# Patient Record
Sex: MALE | Race: BLACK OR AFRICAN AMERICAN
[De-identification: names, ages, dates, MRNs, and addresses within clinical notes are randomized per-mention and may not be internally consistent; named-entity substitution may affect disease eponyms.]

---

## 2018-08-13 ENCOUNTER — HOSPITAL ENCOUNTER (INPATIENT)
Dept: HOSPITAL 72 - EMR | Age: 58
LOS: 2 days | DRG: 871 | End: 2018-08-15
Payer: MEDICARE

## 2018-08-13 VITALS — DIASTOLIC BLOOD PRESSURE: 56 MMHG | SYSTOLIC BLOOD PRESSURE: 88 MMHG

## 2018-08-13 VITALS — SYSTOLIC BLOOD PRESSURE: 92 MMHG | DIASTOLIC BLOOD PRESSURE: 43 MMHG

## 2018-08-13 VITALS — DIASTOLIC BLOOD PRESSURE: 44 MMHG | SYSTOLIC BLOOD PRESSURE: 104 MMHG

## 2018-08-13 VITALS — SYSTOLIC BLOOD PRESSURE: 93 MMHG | DIASTOLIC BLOOD PRESSURE: 30 MMHG

## 2018-08-13 VITALS — DIASTOLIC BLOOD PRESSURE: 95 MMHG | SYSTOLIC BLOOD PRESSURE: 92 MMHG

## 2018-08-13 VITALS — DIASTOLIC BLOOD PRESSURE: 55 MMHG | SYSTOLIC BLOOD PRESSURE: 77 MMHG

## 2018-08-13 VITALS — SYSTOLIC BLOOD PRESSURE: 91 MMHG | DIASTOLIC BLOOD PRESSURE: 43 MMHG

## 2018-08-13 VITALS — DIASTOLIC BLOOD PRESSURE: 30 MMHG | SYSTOLIC BLOOD PRESSURE: 93 MMHG

## 2018-08-13 VITALS — SYSTOLIC BLOOD PRESSURE: 87 MMHG | DIASTOLIC BLOOD PRESSURE: 28 MMHG

## 2018-08-13 VITALS — SYSTOLIC BLOOD PRESSURE: 100 MMHG | DIASTOLIC BLOOD PRESSURE: 44 MMHG

## 2018-08-13 VITALS — BODY MASS INDEX: 44.1 KG/M2 | HEIGHT: 71 IN | WEIGHT: 315 LBS

## 2018-08-13 VITALS — DIASTOLIC BLOOD PRESSURE: 51 MMHG | SYSTOLIC BLOOD PRESSURE: 84 MMHG

## 2018-08-13 VITALS — DIASTOLIC BLOOD PRESSURE: 38 MMHG | SYSTOLIC BLOOD PRESSURE: 91 MMHG

## 2018-08-13 VITALS — SYSTOLIC BLOOD PRESSURE: 96 MMHG | DIASTOLIC BLOOD PRESSURE: 49 MMHG

## 2018-08-13 DIAGNOSIS — G93.40: ICD-10-CM

## 2018-08-13 DIAGNOSIS — Z99.81: ICD-10-CM

## 2018-08-13 DIAGNOSIS — E78.5: ICD-10-CM

## 2018-08-13 DIAGNOSIS — E66.2: ICD-10-CM

## 2018-08-13 DIAGNOSIS — J96.22: ICD-10-CM

## 2018-08-13 DIAGNOSIS — Z78.1: ICD-10-CM

## 2018-08-13 DIAGNOSIS — E66.01: ICD-10-CM

## 2018-08-13 DIAGNOSIS — N18.9: ICD-10-CM

## 2018-08-13 DIAGNOSIS — G47.33: ICD-10-CM

## 2018-08-13 DIAGNOSIS — A41.9: Primary | ICD-10-CM

## 2018-08-13 DIAGNOSIS — M25.552: ICD-10-CM

## 2018-08-13 DIAGNOSIS — D69.6: ICD-10-CM

## 2018-08-13 DIAGNOSIS — Z91.14: ICD-10-CM

## 2018-08-13 DIAGNOSIS — R65.21: ICD-10-CM

## 2018-08-13 DIAGNOSIS — D63.1: ICD-10-CM

## 2018-08-13 DIAGNOSIS — J96.21: ICD-10-CM

## 2018-08-13 DIAGNOSIS — E86.0: ICD-10-CM

## 2018-08-13 DIAGNOSIS — E11.22: ICD-10-CM

## 2018-08-13 DIAGNOSIS — N17.9: ICD-10-CM

## 2018-08-13 DIAGNOSIS — E87.5: ICD-10-CM

## 2018-08-13 DIAGNOSIS — E87.1: ICD-10-CM

## 2018-08-13 DIAGNOSIS — M25.551: ICD-10-CM

## 2018-08-13 DIAGNOSIS — E11.649: ICD-10-CM

## 2018-08-13 DIAGNOSIS — Z74.01: ICD-10-CM

## 2018-08-13 DIAGNOSIS — I12.9: ICD-10-CM

## 2018-08-13 DIAGNOSIS — Z91.19: ICD-10-CM

## 2018-08-13 LAB
ADD MANUAL DIFF: YES
ALBUMIN SERPL-MCNC: 4.1 G/DL (ref 3.4–5)
ALBUMIN/GLOB SERPL: 1.5 {RATIO} (ref 1–2.7)
ALP SERPL-CCNC: 57 U/L (ref 46–116)
ALT SERPL-CCNC: 151 U/L (ref 12–78)
ANION GAP SERPL CALC-SCNC: 11 MMOL/L (ref 5–15)
APPEARANCE UR: CLEAR
APTT PPP: YELLOW S
AST SERPL-CCNC: 37 U/L (ref 15–37)
BILIRUB SERPL-MCNC: 0.6 MG/DL (ref 0.2–1)
BUN SERPL-MCNC: 82 MG/DL (ref 7–18)
CALCIUM SERPL-MCNC: 10.9 MG/DL (ref 8.5–10.1)
CHLORIDE SERPL-SCNC: 96 MMOL/L (ref 98–107)
CK SERPL-CCNC: 335 U/L (ref 26–308)
CO2 SERPL-SCNC: 21 MMOL/L (ref 21–32)
CREAT SERPL-MCNC: 9.6 MG/DL (ref 0.55–1.3)
ERYTHROCYTE [DISTWIDTH] IN BLOOD BY AUTOMATED COUNT: 13.8 % (ref 11.6–14.8)
GLOBULIN SER-MCNC: 2.7 G/DL
GLUCOSE UR STRIP-MCNC: NEGATIVE MG/DL
HCT VFR BLD CALC: 24.3 % (ref 42–52)
HGB BLD-MCNC: 8.5 G/DL (ref 14.2–18)
KETONES UR QL STRIP: NEGATIVE
LEUKOCYTE ESTERASE UR QL STRIP: (no result)
MCV RBC AUTO: 87 FL (ref 80–99)
NITRITE UR QL STRIP: NEGATIVE
PH UR STRIP: 5 [PH] (ref 4.5–8)
PLATELET # BLD: 60 K/UL (ref 150–450)
POTASSIUM SERPL-SCNC: 7.8 MMOL/L (ref 3.5–5.1)
PROT UR QL STRIP: (no result)
RBC # BLD AUTO: 2.8 M/UL (ref 4.7–6.1)
SODIUM SERPL-SCNC: 129 MMOL/L (ref 136–145)
SP GR UR STRIP: 1.01 (ref 1–1.03)
UROBILINOGEN UR-MCNC: NORMAL MG/DL (ref 0–1)
WBC # BLD AUTO: 6.8 K/UL (ref 4.8–10.8)

## 2018-08-13 PROCEDURE — 86703 HIV-1/HIV-2 1 RESULT ANTBDY: CPT

## 2018-08-13 PROCEDURE — 85044 MANUAL RETICULOCYTE COUNT: CPT

## 2018-08-13 PROCEDURE — 94002 VENT MGMT INPAT INIT DAY: CPT

## 2018-08-13 PROCEDURE — 83735 ASSAY OF MAGNESIUM: CPT

## 2018-08-13 PROCEDURE — 71045 X-RAY EXAM CHEST 1 VIEW: CPT

## 2018-08-13 PROCEDURE — 83880 ASSAY OF NATRIURETIC PEPTIDE: CPT

## 2018-08-13 PROCEDURE — 82746 ASSAY OF FOLIC ACID SERUM: CPT

## 2018-08-13 PROCEDURE — 80053 COMPREHEN METABOLIC PANEL: CPT

## 2018-08-13 PROCEDURE — 87081 CULTURE SCREEN ONLY: CPT

## 2018-08-13 PROCEDURE — 76770 US EXAM ABDO BACK WALL COMP: CPT

## 2018-08-13 PROCEDURE — 74018 RADEX ABDOMEN 1 VIEW: CPT

## 2018-08-13 PROCEDURE — 83615 LACTATE (LD) (LDH) ENZYME: CPT

## 2018-08-13 PROCEDURE — 82977 ASSAY OF GGT: CPT

## 2018-08-13 PROCEDURE — 84443 ASSAY THYROID STIM HORMONE: CPT

## 2018-08-13 PROCEDURE — 80048 BASIC METABOLIC PNL TOTAL CA: CPT

## 2018-08-13 PROCEDURE — 81003 URINALYSIS AUTO W/O SCOPE: CPT

## 2018-08-13 PROCEDURE — 82270 OCCULT BLOOD FECES: CPT

## 2018-08-13 PROCEDURE — 85007 BL SMEAR W/DIFF WBC COUNT: CPT

## 2018-08-13 PROCEDURE — 36600 WITHDRAWAL OF ARTERIAL BLOOD: CPT

## 2018-08-13 PROCEDURE — 87340 HEPATITIS B SURFACE AG IA: CPT

## 2018-08-13 PROCEDURE — 36415 COLL VENOUS BLD VENIPUNCTURE: CPT

## 2018-08-13 PROCEDURE — 86920 COMPATIBILITY TEST SPIN: CPT

## 2018-08-13 PROCEDURE — 93970 EXTREMITY STUDY: CPT

## 2018-08-13 PROCEDURE — 87040 BLOOD CULTURE FOR BACTERIA: CPT

## 2018-08-13 PROCEDURE — 82962 GLUCOSE BLOOD TEST: CPT

## 2018-08-13 PROCEDURE — 93005 ELECTROCARDIOGRAM TRACING: CPT

## 2018-08-13 PROCEDURE — 86705 HEP B CORE ANTIBODY IGM: CPT

## 2018-08-13 PROCEDURE — 02HV33Z INSERTION OF INFUSION DEVICE INTO SUPERIOR VENA CAVA, PERCUTANEOUS APPROACH: ICD-10-PCS

## 2018-08-13 PROCEDURE — 84100 ASSAY OF PHOSPHORUS: CPT

## 2018-08-13 PROCEDURE — 84550 ASSAY OF BLOOD/URIC ACID: CPT

## 2018-08-13 PROCEDURE — 80061 LIPID PANEL: CPT

## 2018-08-13 PROCEDURE — 94003 VENT MGMT INPAT SUBQ DAY: CPT

## 2018-08-13 PROCEDURE — 85025 COMPLETE CBC W/AUTO DIFF WBC: CPT

## 2018-08-13 PROCEDURE — 86900 BLOOD TYPING SEROLOGIC ABO: CPT

## 2018-08-13 PROCEDURE — 83036 HEMOGLOBIN GLYCOSYLATED A1C: CPT

## 2018-08-13 PROCEDURE — 87086 URINE CULTURE/COLONY COUNT: CPT

## 2018-08-13 PROCEDURE — 82803 BLOOD GASES ANY COMBINATION: CPT

## 2018-08-13 PROCEDURE — 02H633Z INSERTION OF INFUSION DEVICE INTO RIGHT ATRIUM, PERCUTANEOUS APPROACH: ICD-10-PCS

## 2018-08-13 PROCEDURE — 82550 ASSAY OF CK (CPK): CPT

## 2018-08-13 PROCEDURE — 86850 RBC ANTIBODY SCREEN: CPT

## 2018-08-13 PROCEDURE — 86901 BLOOD TYPING SEROLOGIC RH(D): CPT

## 2018-08-13 PROCEDURE — 76700 US EXAM ABDOM COMPLETE: CPT

## 2018-08-13 PROCEDURE — 86709 HEPATITIS A IGM ANTIBODY: CPT

## 2018-08-13 PROCEDURE — 94664 DEMO&/EVAL PT USE INHALER: CPT

## 2018-08-13 PROCEDURE — 86803 HEPATITIS C AB TEST: CPT

## 2018-08-13 PROCEDURE — 83540 ASSAY OF IRON: CPT

## 2018-08-13 PROCEDURE — 93306 TTE W/DOPPLER COMPLETE: CPT

## 2018-08-13 PROCEDURE — 82728 ASSAY OF FERRITIN: CPT

## 2018-08-13 PROCEDURE — 86140 C-REACTIVE PROTEIN: CPT

## 2018-08-13 PROCEDURE — 83605 ASSAY OF LACTIC ACID: CPT

## 2018-08-13 PROCEDURE — 94640 AIRWAY INHALATION TREATMENT: CPT

## 2018-08-13 PROCEDURE — 84484 ASSAY OF TROPONIN QUANT: CPT

## 2018-08-13 PROCEDURE — 82607 VITAMIN B-12: CPT

## 2018-08-13 PROCEDURE — 83550 IRON BINDING TEST: CPT

## 2018-08-13 PROCEDURE — 83010 ASSAY OF HAPTOGLOBIN QUANT: CPT

## 2018-08-13 PROCEDURE — 82248 BILIRUBIN DIRECT: CPT

## 2018-08-13 RX ADMIN — SODIUM CHLORIDE SCH MLS/HR: 0.9 INJECTION INTRAVENOUS at 22:51

## 2018-08-13 RX ADMIN — DAPTOMYCIN SCH MLS/HR: 500 INJECTION, POWDER, LYOPHILIZED, FOR SOLUTION INTRAVENOUS at 23:00

## 2018-08-13 NOTE — CONSULTATION
Consult Note


Consult Note





seen in ER


Assessment/Plan





acute renal failure- (on Glucophage- Lotensin), Hypotension


underlying chronic renal failure-


morbid obesity-


DM


Hypotension


HyperKalemia


ROSAS


Anemia








IV fluid-


Dialysis -


ICU setting


Keep BP over 90 syst


Anemia ahuja


avoid nephrotoxics











Filippo Herrera MD Aug 13, 2018 22:00

## 2018-08-13 NOTE — BRIEF OPERATIVE NOTE
Immediate Post Operative Note


Operative Note


Pre-op Diagnosis:


sepsis, shock, hypotension, hyperkalemia, renal insufficiency,


Procedure:


1. right IJ TLC placement


2. right IJ HD cath placement


Post-op Diagnosis:  same as pre-op


Surgeon:  tesha


Anesthesia:  local


Specimen:  none


Complications:  none


Condition:  unstable


Fluids:  n/a


Estimated Blood Loss:  minimal


Drains:  none


Implant(s) used?:  Shane Perez Aug 13, 2018 21:21

## 2018-08-13 NOTE — PULMONOLGY CRITICAL CARE NOTE
Critical Care - Asmt/Plan


Problems:  


(1) HTN (hypertension)


(2) Diabetes


(3) Morbid obesity


(4) Obesity hypoventilation syndrome


(5) ROSAS (obstructive sleep apnea)


(6) Dehydration


(7) Hypotension


(8) Hyponatremia


(9) Hyperkalemia


(10) AIRAM (acute kidney injury)


(11) Anemia


Respiratory:  monitor respiratory rate, adjust FIO2 - PRN O2 to keep SaO2 > 90%

, CXR, ABG, other - BiPAP 15/5 qHS and PRN


Cardiac:  continue to monitor HR/BP, other - TTE


Renal:  keep IV fluid, check electrolytes, other - HD without UF ordered by 

renal


Infectious Disease:  continue antibiotics - per ID, F/u Cx's


Gastrointestinal:  start feedings - PO with aspiration precautions


Endocrine:  monitor blood sugar, check TSH, check HgA1C, other - Sliding scale


Neurologic:  keep patient comfortable


Prophylaxis:  Protonix, Heparin


Disposition:  keep in ICU


Time Spent (Minutes):  60


Notes Reviewed:  renal, other - Surgery, ERMD


Discussed with:  nurses, consultants





Critical Care - Objective





Last 24 Hour Vital Signs








  Date Time  Temp Pulse Resp B/P (MAP) Pulse Ox O2 Delivery O2 Flow Rate FiO2


 


8/13/18 19:45 98.4 69 20 93/30 100 Nasal Cannula 2.0 28





 98.4       


 


8/13/18 19:30 98.2 64 22 87/28 100 Nasal Cannula 2.0 28





 98.2       


 


8/13/18 19:27  60 20  100 Nasal Cannula 2.0 28


 


8/13/18 19:21  52 19  100 Nasal Cannula 2.0 28


 


8/13/18 19:20  57 19   Nasal Cannula 2.0 28


 


8/13/18 19:20      Nasal Cannula 2.0 28


 


8/13/18 19:15  57 19 91/38 100 Nasal Cannula 2.0 


 


8/13/18 19:10  57 19 91/43 100 Nasal Cannula 2.0 


 


8/13/18 19:00  60 19 87/37 100 Nasal Cannula 2.0 


 


8/13/18 19:00  58 19 92/43 100 Nasal Cannula 2.0 


 


8/13/18 18:30  58 17 88/56 100 Nasal Cannula 2.0 


 


8/13/18 17:45 98.4 56 16 77/55 100 Nasal Cannula 2.0 





 98.4       


 


8/13/18 17:45  58 19   Nasal Cannula 2.0 


 


8/13/18 17:37 98.4 64 16 77/55 99 Room Air  





 98.4       








Status:  awake, other - morbidly obese


Condition:  critical


HEENT:  atraumatic, normocephalic


Neck:  full ROM, trach


Lungs:  clear


Heart:  HR/BP unstable


Abdomen:  soft, non-tender, active bowel sounds


Extremities:  no C/C/E


Accucheck:  66


Blood Sugars:  BS not controlled





Critical Care - Subjective


ICU Day:  1


Intubation Day:  N/A


Interval Events:


57 M h/o CHRONIC HYPERCAPNIC RF, ROSAS/OHV, CKD, HTN, HL, DM BIB EMS with AMS and 

hypoTN, Na 129, K 7.8, BUN/Cr 82/9.6.  Seen by surgery R BRANDON Finney placed, HD 

ordered, getting IVF. No F/C/CP/SOB/cough/HA/dizziness/N/V/D/C. He states he 

uses his BiPAP at home nightly and is on 2L O2 RTC> 








Active Scripts








 Medications  Dose


 Route/Sig


 Max Daily Dose Days Date Category


 


 Carvedilol*


  (Carvedilol)


 3.125 Mg Tablet  Unknown Dose


 ORAL EVERY 12 HOURS


    8/13/18 Reported


 


 Isosorbide


 Dinitrate*


  (Isosorbide


 Dinitrate) 5 Mg


 Tablet  Unknown Dose


 ORAL


    8/13/18 Reported


 


 Aspir 81*


  (Aspirin) 81 Mg


 Tablet.dr  81 Mg


 ORAL DAILY


    8/13/18 Reported


 


 Glyburide 5 Mg


 Tablet  5 Mg


 PO


    8/13/18 Reported


 


 Metformin Hcl*


  (Metformin HCl)


 850 Mg Tablet  750 Mg


 ORAL DAILY


    8/13/18 Reported


 


 Benazepril Hcl*


  (Benazepril HCl)


 20 Mg Tablet  20 Mg


 ORAL EVERY 12 HOURS


    8/13/18 Reported


 


 Norvasc


  (Amlodipine


 Besylate) 10 Mg


 Tablet  10 Mg


 ORAL DAILY


    8/13/18 Reported








Condition:  critical


IV Access:  central


EKG Rhythm:  Sinus Rhythm


FI02:  28


Sputum Amount:  None


Fluids:  NS wide open


Labs:





Laboratory Tests








Test


  8/13/18


17:55 8/13/18


19:54


 


White Blood Count


  6.8 K/UL


(4.8-10.8) 


 


 


Red Blood Count


  2.80 M/UL


(4.70-6.10)  L 


 


 


Hemoglobin


  8.5 G/DL


(14.2-18.0)  L 


 


 


Hematocrit


  24.3 %


(42.0-52.0)  L 


 


 


Mean Corpuscular Volume 87 FL (80-99)   


 


Mean Corpuscular Hemoglobin


  30.5 PG


(27.0-31.0) 


 


 


Mean Corpuscular Hemoglobin


Concent 35.1 G/DL


(32.0-36.0) 


 


 


Red Cell Distribution Width


  13.8 %


(11.6-14.8) 


 


 


Platelet Count


  60 K/UL


(150-450)  L 


 


 


Mean Platelet Volume


  6.5 FL


(6.5-10.1) 


 


 


Neutrophils (%) (Auto)


  % (45.0-75.0)


  


 


 


Lymphocytes (%) (Auto)


  % (20.0-45.0)


  


 


 


Monocytes (%) (Auto)  % (1.0-10.0)   


 


Eosinophils (%) (Auto)  % (0.0-3.0)   


 


Basophils (%) (Auto)  % (0.0-2.0)   


 


Differential Total Cells


Counted 100  


  


 


 


Neutrophils % (Manual) 49 % (45-75)   


 


Lymphocytes % (Manual) 38 % (20-45)   


 


Monocytes % (Manual) 10 % (1-10)   


 


Eosinophils % (Manual) 2 % (0-3)   


 


Basophils % (Manual) 1 % (0-2)   


 


Band Neutrophils 0 % (0-8)   


 


Platelet Estimate Decreased  L 


 


Platelet Morphology Normal   


 


Hypochromasia 1+   


 


Anisocytosis 1+   


 


Sodium Level


  129 MMOL/L


(136-145)  L 


 


 


Potassium Level


  7.8 MMOL/L


(3.5-5.1)  *H 


 


 


Chloride Level


  96 MMOL/L


()  L 


 


 


Carbon Dioxide Level


  21 MMOL/L


(21-32) 


 


 


Anion Gap


  11 mmol/L


(5-15) 


 


 


Blood Urea Nitrogen


  82 mg/dL


(7-18)  H 


 


 


Creatinine


  9.6 MG/DL


(0.55-1.30)  H 


 


 


Estimat Glomerular Filtration


Rate 6.9 mL/min


(>60) 


 


 


Glucose Level


  86 MG/DL


() 


 


 


Lactic Acid Level


  1.50 mmol/L


(0.4-2.0) 


 


 


Calcium Level


  10.9 MG/DL


(8.5-10.1)  H 


 


 


Total Bilirubin


  0.6 MG/DL


(0.2-1.0) 


 


 


Aspartate Amino Transf


(AST/SGOT) 37 U/L (15-37)


  


 


 


Alanine Aminotransferase


(ALT/SGPT) 151 U/L


(12-78)  H 


 


 


Alkaline Phosphatase


  57 U/L


() 


 


 


Total Creatine Kinase


  335 U/L


()  H 


 


 


Troponin I


  0.000 ng/mL


(0.000-0.056) 


 


 


Total Protein


  6.8 G/DL


(6.4-8.2) 


 


 


Albumin


  4.1 G/DL


(3.4-5.0) 


 


 


Globulin 2.7 g/dL   


 


Albumin/Globulin Ratio 1.5 (1.0-2.7)   


 


Urine Color  Yellow  


 


Urine Appearance  Clear  


 


Urine pH  5 (4.5-8.0)  


 


Urine Specific Gravity


  


  1.015


(1.005-1.035)


 


Urine Protein


  


  2+ (NEGATIVE)


H


 


Urine Glucose (UA)


  


  Negative


(NEGATIVE)


 


Urine Ketones


  


  Negative


(NEGATIVE)


 


Urine Occult Blood


  


  2+ (NEGATIVE)


H


 


Urine Nitrite


  


  Negative


(NEGATIVE)


 


Urine Bilirubin


  


  1+ (NEGATIVE)


H


 


Urine Ictotest


  


  Negative


(NEGATIVE)


 


Urine Urobilinogen


  


  Normal MG/DL


(0.0-1.0)


 


Urine Leukocyte Esterase


  


  1+ (NEGATIVE)


H


 


Urine RBC


  


  5-10 /HPF (0 -


0)  H


 


Urine WBC


  


  2-4 /HPF (0 -


0)


 


Urine Squamous Epithelial


Cells 


  None /LPF


(NONE/OCC)


 


Urine Bacteria


  


  Few /HPF


(NONE)

















Tristan Aceves MD Aug 13, 2018 21:55

## 2018-08-14 VITALS — DIASTOLIC BLOOD PRESSURE: 50 MMHG | SYSTOLIC BLOOD PRESSURE: 73 MMHG

## 2018-08-14 VITALS — SYSTOLIC BLOOD PRESSURE: 74 MMHG | DIASTOLIC BLOOD PRESSURE: 33 MMHG

## 2018-08-14 VITALS — DIASTOLIC BLOOD PRESSURE: 36 MMHG | SYSTOLIC BLOOD PRESSURE: 75 MMHG

## 2018-08-14 VITALS — SYSTOLIC BLOOD PRESSURE: 132 MMHG | DIASTOLIC BLOOD PRESSURE: 51 MMHG

## 2018-08-14 VITALS — SYSTOLIC BLOOD PRESSURE: 133 MMHG | DIASTOLIC BLOOD PRESSURE: 43 MMHG

## 2018-08-14 VITALS — SYSTOLIC BLOOD PRESSURE: 121 MMHG | DIASTOLIC BLOOD PRESSURE: 54 MMHG

## 2018-08-14 VITALS — SYSTOLIC BLOOD PRESSURE: 100 MMHG | DIASTOLIC BLOOD PRESSURE: 39 MMHG

## 2018-08-14 VITALS — SYSTOLIC BLOOD PRESSURE: 91 MMHG | DIASTOLIC BLOOD PRESSURE: 48 MMHG

## 2018-08-14 VITALS — SYSTOLIC BLOOD PRESSURE: 76 MMHG | DIASTOLIC BLOOD PRESSURE: 36 MMHG

## 2018-08-14 VITALS — SYSTOLIC BLOOD PRESSURE: 102 MMHG | DIASTOLIC BLOOD PRESSURE: 40 MMHG

## 2018-08-14 VITALS — DIASTOLIC BLOOD PRESSURE: 29 MMHG | SYSTOLIC BLOOD PRESSURE: 111 MMHG

## 2018-08-14 VITALS — DIASTOLIC BLOOD PRESSURE: 44 MMHG | SYSTOLIC BLOOD PRESSURE: 116 MMHG

## 2018-08-14 VITALS — DIASTOLIC BLOOD PRESSURE: 121 MMHG | SYSTOLIC BLOOD PRESSURE: 140 MMHG

## 2018-08-14 VITALS — SYSTOLIC BLOOD PRESSURE: 95 MMHG | DIASTOLIC BLOOD PRESSURE: 29 MMHG

## 2018-08-14 VITALS — DIASTOLIC BLOOD PRESSURE: 40 MMHG | SYSTOLIC BLOOD PRESSURE: 117 MMHG

## 2018-08-14 VITALS — DIASTOLIC BLOOD PRESSURE: 71 MMHG | SYSTOLIC BLOOD PRESSURE: 101 MMHG

## 2018-08-14 VITALS — SYSTOLIC BLOOD PRESSURE: 101 MMHG | DIASTOLIC BLOOD PRESSURE: 36 MMHG

## 2018-08-14 VITALS — SYSTOLIC BLOOD PRESSURE: 129 MMHG | DIASTOLIC BLOOD PRESSURE: 37 MMHG

## 2018-08-14 VITALS — SYSTOLIC BLOOD PRESSURE: 107 MMHG | DIASTOLIC BLOOD PRESSURE: 50 MMHG

## 2018-08-14 VITALS — DIASTOLIC BLOOD PRESSURE: 33 MMHG | SYSTOLIC BLOOD PRESSURE: 79 MMHG

## 2018-08-14 VITALS — SYSTOLIC BLOOD PRESSURE: 117 MMHG | DIASTOLIC BLOOD PRESSURE: 40 MMHG

## 2018-08-14 VITALS — DIASTOLIC BLOOD PRESSURE: 34 MMHG | SYSTOLIC BLOOD PRESSURE: 90 MMHG

## 2018-08-14 VITALS — DIASTOLIC BLOOD PRESSURE: 26 MMHG | SYSTOLIC BLOOD PRESSURE: 61 MMHG

## 2018-08-14 VITALS — SYSTOLIC BLOOD PRESSURE: 141 MMHG | DIASTOLIC BLOOD PRESSURE: 53 MMHG

## 2018-08-14 VITALS — SYSTOLIC BLOOD PRESSURE: 107 MMHG | DIASTOLIC BLOOD PRESSURE: 51 MMHG

## 2018-08-14 VITALS — DIASTOLIC BLOOD PRESSURE: 39 MMHG | SYSTOLIC BLOOD PRESSURE: 80 MMHG

## 2018-08-14 VITALS — SYSTOLIC BLOOD PRESSURE: 95 MMHG | DIASTOLIC BLOOD PRESSURE: 32 MMHG

## 2018-08-14 VITALS — DIASTOLIC BLOOD PRESSURE: 43 MMHG | SYSTOLIC BLOOD PRESSURE: 104 MMHG

## 2018-08-14 VITALS — DIASTOLIC BLOOD PRESSURE: 50 MMHG | SYSTOLIC BLOOD PRESSURE: 94 MMHG

## 2018-08-14 VITALS — DIASTOLIC BLOOD PRESSURE: 36 MMHG | SYSTOLIC BLOOD PRESSURE: 78 MMHG

## 2018-08-14 VITALS — DIASTOLIC BLOOD PRESSURE: 49 MMHG | SYSTOLIC BLOOD PRESSURE: 103 MMHG

## 2018-08-14 VITALS — SYSTOLIC BLOOD PRESSURE: 86 MMHG | DIASTOLIC BLOOD PRESSURE: 35 MMHG

## 2018-08-14 VITALS — SYSTOLIC BLOOD PRESSURE: 100 MMHG | DIASTOLIC BLOOD PRESSURE: 38 MMHG

## 2018-08-14 LAB
% IRON SATURATION: 36 % (ref 15–50)
% IRON SATURATION: 36 % (ref 15–50)
ADD MANUAL DIFF: NO
ADD MANUAL DIFF: NO
ADD MANUAL DIFF: YES
ALBUMIN SERPL-MCNC: 3.5 G/DL (ref 3.4–5)
ALBUMIN/GLOB SERPL: 1.5 {RATIO} (ref 1–2.7)
ALP SERPL-CCNC: 45 U/L (ref 46–116)
ALT SERPL-CCNC: 132 U/L (ref 12–78)
ANION GAP SERPL CALC-SCNC: 12 MMOL/L (ref 5–15)
ANION GAP SERPL CALC-SCNC: 7 MMOL/L (ref 5–15)
ANION GAP SERPL CALC-SCNC: 7 MMOL/L (ref 5–15)
AST SERPL-CCNC: 36 U/L (ref 15–37)
BASOPHILS NFR BLD AUTO: 5.8 % (ref 0–2)
BILIRUB SERPL-MCNC: 0.5 MG/DL (ref 0.2–1)
BUN SERPL-MCNC: 76 MG/DL (ref 7–18)
BUN SERPL-MCNC: 77 MG/DL (ref 7–18)
BUN SERPL-MCNC: 78 MG/DL (ref 7–18)
CALCIUM SERPL-MCNC: 9 MG/DL (ref 8.5–10.1)
CALCIUM SERPL-MCNC: 9.2 MG/DL (ref 8.5–10.1)
CALCIUM SERPL-MCNC: 9.6 MG/DL (ref 8.5–10.1)
CHLORIDE SERPL-SCNC: 101 MMOL/L (ref 98–107)
CHLORIDE SERPL-SCNC: 103 MMOL/L (ref 98–107)
CHLORIDE SERPL-SCNC: 104 MMOL/L (ref 98–107)
CHOLEST SERPL-MCNC: 123 MG/DL (ref ?–200)
CO2 SERPL-SCNC: 18 MMOL/L (ref 21–32)
CO2 SERPL-SCNC: 19 MMOL/L (ref 21–32)
CO2 SERPL-SCNC: 20 MMOL/L (ref 21–32)
CREAT SERPL-MCNC: 8 MG/DL (ref 0.55–1.3)
CREAT SERPL-MCNC: 8.3 MG/DL (ref 0.55–1.3)
CREAT SERPL-MCNC: 8.8 MG/DL (ref 0.55–1.3)
EOSINOPHIL NFR BLD AUTO: 1.4 % (ref 0–3)
ERYTHROCYTE [DISTWIDTH] IN BLOOD BY AUTOMATED COUNT: 13.6 % (ref 11.6–14.8)
ERYTHROCYTE [DISTWIDTH] IN BLOOD BY AUTOMATED COUNT: 14.5 % (ref 11.6–14.8)
ERYTHROCYTE [DISTWIDTH] IN BLOOD BY AUTOMATED COUNT: 14.7 % (ref 11.6–14.8)
FERRITIN SERPL-MCNC: 494 NG/ML (ref 8–388)
FERRITIN SERPL-MCNC: 611 NG/ML (ref 8–388)
GAMMA GLUTAMYL TRANSPEPTIDASE: 314 U/L (ref 5–85)
GLOBULIN SER-MCNC: 2.3 G/DL
HCT VFR BLD CALC: 20.2 % (ref 42–52)
HCT VFR BLD CALC: 26.2 % (ref 42–52)
HCT VFR BLD CALC: 26.9 % (ref 42–52)
HDLC SERPL-MCNC: 39 MG/DL (ref 40–60)
HGB BLD-MCNC: 7 G/DL (ref 14.2–18)
HGB BLD-MCNC: 9.1 G/DL (ref 14.2–18)
HGB BLD-MCNC: 9.1 G/DL (ref 14.2–18)
IRON SERPL-MCNC: 84 UG/DL (ref 50–175)
IRON SERPL-MCNC: 90 UG/DL (ref 50–175)
LDH SERPL L TO P-CCNC: 96 U/L (ref 81–234)
LYMPHOCYTES NFR BLD AUTO: 31.2 % (ref 20–45)
MCV RBC AUTO: 86 FL (ref 80–99)
MCV RBC AUTO: 90 FL (ref 80–99)
MCV RBC AUTO: 91 FL (ref 80–99)
MONOCYTES NFR BLD AUTO: 16.1 % (ref 1–10)
NEUTROPHILS NFR BLD AUTO: 45.5 % (ref 45–75)
PHOSPHATE SERPL-MCNC: 5.1 MG/DL (ref 2.5–4.9)
PLATELET # BLD: 47 K/UL (ref 150–450)
PLATELET # BLD: 53 K/UL (ref 150–450)
PLATELET # BLD: 55 K/UL (ref 150–450)
POTASSIUM SERPL-SCNC: 7.1 MMOL/L (ref 3.5–5.1)
POTASSIUM SERPL-SCNC: 7.9 MMOL/L (ref 3.5–5.1)
POTASSIUM SERPL-SCNC: 8.2 MMOL/L (ref 3.5–5.1)
RBC # BLD AUTO: 2.36 M/UL (ref 4.7–6.1)
RBC # BLD AUTO: 2.92 M/UL (ref 4.7–6.1)
RBC # BLD AUTO: 2.97 M/UL (ref 4.7–6.1)
SODIUM SERPL-SCNC: 129 MMOL/L (ref 136–145)
SODIUM SERPL-SCNC: 130 MMOL/L (ref 136–145)
SODIUM SERPL-SCNC: 132 MMOL/L (ref 136–145)
TIBC SERPL-MCNC: 236 UG/DL (ref 250–450)
TIBC SERPL-MCNC: 248 UG/DL (ref 250–450)
TRIGL SERPL-MCNC: 64 MG/DL (ref 30–150)
UNSATURATED IRON BINDING: 152 UG/DL (ref 112–346)
UNSATURATED IRON BINDING: 158 UG/DL (ref 112–346)
WBC # BLD AUTO: 11.1 K/UL (ref 4.8–10.8)
WBC # BLD AUTO: 5.1 K/UL (ref 4.8–10.8)
WBC # BLD AUTO: 6.3 K/UL (ref 4.8–10.8)

## 2018-08-14 PROCEDURE — 5A1D70Z PERFORMANCE OF URINARY FILTRATION, INTERMITTENT, LESS THAN 6 HOURS PER DAY: ICD-10-PCS

## 2018-08-14 PROCEDURE — 0BH17EZ INSERTION OF ENDOTRACHEAL AIRWAY INTO TRACHEA, VIA NATURAL OR ARTIFICIAL OPENING: ICD-10-PCS

## 2018-08-14 PROCEDURE — 30233N1 TRANSFUSION OF NONAUTOLOGOUS RED BLOOD CELLS INTO PERIPHERAL VEIN, PERCUTANEOUS APPROACH: ICD-10-PCS

## 2018-08-14 PROCEDURE — 3E043XZ INTRODUCTION OF VASOPRESSOR INTO CENTRAL VEIN, PERCUTANEOUS APPROACH: ICD-10-PCS

## 2018-08-14 PROCEDURE — 5A1935Z RESPIRATORY VENTILATION, LESS THAN 24 CONSECUTIVE HOURS: ICD-10-PCS

## 2018-08-14 RX ADMIN — HUMAN INSULIN SCH MLS/HR: 100 INJECTION, SOLUTION SUBCUTANEOUS at 16:00

## 2018-08-14 RX ADMIN — DAPTOMYCIN SCH MLS/HR: 500 INJECTION, POWDER, LYOPHILIZED, FOR SOLUTION INTRAVENOUS at 07:20

## 2018-08-14 RX ADMIN — IPRATROPIUM BROMIDE AND ALBUTEROL SULFATE SCH ML: .5; 3 SOLUTION RESPIRATORY (INHALATION) at 12:56

## 2018-08-14 RX ADMIN — IPRATROPIUM BROMIDE AND ALBUTEROL SULFATE SCH ML: .5; 3 SOLUTION RESPIRATORY (INHALATION) at 19:00

## 2018-08-14 RX ADMIN — PANTOPRAZOLE SODIUM SCH MG: 40 INJECTION, POWDER, FOR SOLUTION INTRAVENOUS at 11:08

## 2018-08-14 RX ADMIN — HUMAN INSULIN SCH MLS/HR: 100 INJECTION, SOLUTION SUBCUTANEOUS at 21:50

## 2018-08-14 RX ADMIN — HUMAN INSULIN SCH MLS/HR: 100 INJECTION, SOLUTION SUBCUTANEOUS at 11:36

## 2018-08-14 RX ADMIN — PANTOPRAZOLE SODIUM SCH MG: 40 INJECTION, POWDER, FOR SOLUTION INTRAVENOUS at 21:50

## 2018-08-14 RX ADMIN — SODIUM CHLORIDE SCH MLS/HR: 0.9 INJECTION INTRAVENOUS at 11:08

## 2018-08-14 RX ADMIN — HYDROCORTISONE SODIUM SUCCINATE SCH MG: 100 INJECTION, POWDER, FOR SOLUTION INTRAMUSCULAR; INTRAVENOUS at 22:45

## 2018-08-14 RX ADMIN — SODIUM CHLORIDE SCH MLS/HR: 0.9 INJECTION INTRAVENOUS at 21:51

## 2018-08-14 NOTE — CARDIOLOGY PROGRESS NOTE
Assessment/Plan


Assessment/Plan


The patient is seen and examined, full consult note will be dictated.





Objective





Last 24 Hour Vital Signs








  Date Time  Temp Pulse Resp B/P (MAP) Pulse Ox O2 Delivery O2 Flow Rate FiO2


 


8/14/18 19:19      Bi-pap  25


 


8/14/18 19:00  85 19 94/50 (65) 99   


 


8/14/18 19:00    86/70    


 


8/14/18 18:40 98.4 85 20 121/54 (76)    





 98.4       


 


8/14/18 18:32    119/41    


 


8/14/18 18:00    133/28    


 


8/14/18 18:00  82 21 140/121 (127) 100   


 


8/14/18 17:15    131/38    


 


8/14/18 17:05  75 23  94 Facial  25


 


8/14/18 17:00  81 21 133/43 (73) 99   


 


8/14/18 17:00    121/39    


 


8/14/18 16:45    124/43    


 


8/14/18 16:45  80 20 117/40 (65) 97   


 


8/14/18 16:30  76 19 116/44 (68) 99   


 


8/14/18 16:30    125/44    


 


8/14/18 16:15  78 23 75/36 (49) 98   


 


8/14/18 16:15    119/30    


 


8/14/18 16:05    68/26    


 


8/14/18 16:00      Bi-pap  


 


8/14/18 16:00 97.7 77 23 111/29 (56) 98   





 97.7       


 


8/14/18 15:25  80      


 


8/14/18 15:00      Bi-pap  25


 


8/14/18 15:00 97.8 80 20 100/30 (53)    





 97.8       


 


8/14/18 15:00  78 25 101/71 (81) 87   


 


8/14/18 14:52  80 26  97 Facial  25


 


8/14/18 14:00  78 16 90/34 (52) 94   


 


8/14/18 13:06  77 23  100 Bi-pap  25


 


8/14/18 13:00        25


 


8/14/18 13:00  78 16 100/38 (58) 96   


 


8/14/18 12:56  74 22  100 Bi-pap  25


 


8/14/18 12:55  77 22  100 Facial  25


 


8/14/18 12:00        30


 


8/14/18 12:00  77      


 


8/14/18 12:00      Nasal Cannula 2.0 


 


8/14/18 12:00 98.5 79 23 111/29 (56) 98   





 98.5       


 


8/14/18 11:30  77 28  99 Facial  30


 


8/14/18 11:18        30


 


8/14/18 11:00  84 23 103/49 (67) 97   


 


8/14/18 10:00  88 23 117/40 (65) 99   


 


8/14/18 09:00  85 24 104/43 (63) 100   


 


8/14/18 08:00  84 18   Venturi Mask 15.0 50


 


8/14/18 08:00      Venturi Mask 15.0 50


 


8/14/18 08:00  78      


 


8/14/18 08:00      Nasal Cannula 2.0 


 


8/14/18 08:00 98.0 84 22 102/40 (60) 100   





 98.0       


 


8/14/18 07:00  82 23 107/50 (69) 100   


 


8/14/18 07:00      Venturi Mask  


 


8/14/18 07:00      Venturi Mask  


 


8/14/18 06:00  81 26 107/51 (69) 100   


 


8/14/18 05:00  76 20 95/32 (53) 100   


 


8/14/18 04:00        50


 


8/14/18 04:00 95.4 74 20 101/36 (57) 100   





 95.4       


 


8/14/18 04:00      Venturi Mask 15.0 


 


8/14/18 03:57  76      


 


8/14/18 03:30  76 20 100/39 (59) 100   


 


8/14/18 03:00  77 17 76/36 (49) 100   


 


8/14/18 02:53      Venturi Mask 13.5 


 


8/14/18 02:44 94.7 79 20 80/39 (53)    





 94.7       


 


8/14/18 02:00  73 17 61/26 (38) 100   


 


8/14/18 01:00  71 16 74/33 (47) 100   


 


8/14/18 00:22  71      


 


8/14/18 00:00  69 15 73/50 (58) 100   


 


8/14/18 00:00        50


 


8/14/18 00:00      Venturi Mask 15.0 


 


8/13/18 23:30  74 22 104/44 (64) 100   


 


8/13/18 23:00 93.9 72 20 99/95 (96)    





 93.9       


 


8/13/18 23:00      Venturi Mask 13.5 


 


8/13/18 23:00  72 19 92/35 (54) 100   


 


8/13/18 22:59      Venturi Mask 15.0 50


 


8/13/18 22:59  66 20   Venturi Mask 15.0 50


 


8/13/18 22:45  73 19 100/44 (62) 100   


 


8/13/18 22:30 94.7 74 20 96/49 (65) 98   





 94.7       


 


8/13/18 22:17  82      


 


8/13/18 22:16 93.9 76 20 84/51 (62) 98   





 93.9       


 


8/13/18 22:15      Nasal Cannula 2.0 


 


8/13/18 22:13 98.4 65 20 95/34 100 Nasal Cannula 2.0 28





 98.4       


 


8/13/18 22:13 98.4 69 20 93/30 100 Nasal Cannula 2.0 28





 98.4       


 


8/13/18 22:10 98.4 69 20 93/30 100 Nasal Cannula 2.0 28





 98.4       

















Intake and Output  


 


 8/13/18 8/14/18





 19:00 07:00


 


Intake Total  2110 ml


 


Output Total  120 ml


 


Balance  1990 ml


 


  


 


Intake Oral  200 ml


 


IV Total  1910 ml


 


Output Urine Total  120 ml


 


Hemodialysis UF  0 ml


 


# Voids  1











Laboratory Tests








Test


  8/13/18


21:44 8/14/18


04:00 8/14/18


04:40 8/14/18


09:00


 


Arterial Blood pH


  7.333


(7.350-7.450) 


  


  


 


 


Arterial Blood Partial


Pressure CO2 29.3 mmHg


(35.0-45.0)  L 


  


  


 


 


Arterial Blood Partial


Pressure O2 40.5 mmHg


(75.0-100.0) 


  


  


 


 


Arterial Blood HCO3


  15.2 mmol/L


(22.0-26.0)  L 


  


  


 


 


Arterial Blood Oxygen


Saturation 71.9 %


(92.0-98.0)  L 


  


  


 


 


Arterial Blood Base Excess -9.7     


 


Ken Test Positive     


 


C-Reactive Protein,


Quantitative 


  < 0.4 mg/dL


(0.00-0.90) < 0.4 mg/dL


(0.00-0.90) 


 


 


White Blood Count


  


  


  6.3 K/UL


(4.8-10.8) 


 


 


Red Blood Count


  


  


  2.36 M/UL


(4.70-6.10)  L 


 


 


Hemoglobin


  


  


  7.0 G/DL


(14.2-18.0)  L 


 


 


Hematocrit


  


  


  20.2 %


(42.0-52.0)  L 


 


 


Mean Corpuscular Volume   86 FL (80-99)   


 


Mean Corpuscular Hemoglobin


  


  


  29.9 PG


(27.0-31.0) 


 


 


Mean Corpuscular Hemoglobin


Concent 


  


  34.8 G/DL


(32.0-36.0) 


 


 


Red Cell Distribution Width


  


  


  13.6 %


(11.6-14.8) 


 


 


Platelet Count


  


  


  53 K/UL


(150-450)  L 


 


 


Mean Platelet Volume


  


  


  6.6 FL


(6.5-10.1) 


 


 


Neutrophils (%) (Auto)


  


  


  % (45.0-75.0)


  


 


 


Lymphocytes (%) (Auto)


  


  


  % (20.0-45.0)


  


 


 


Monocytes (%) (Auto)    % (1.0-10.0)   


 


Eosinophils (%) (Auto)    % (0.0-3.0)   


 


Basophils (%) (Auto)    % (0.0-2.0)   


 


Differential Total Cells


Counted 


  


  100  


  


 


 


Neutrophils % (Manual)   45 % (45-75)   


 


Lymphocytes % (Manual)   42 % (20-45)   


 


Monocytes % (Manual)   12 % (1-10)  H 


 


Eosinophils % (Manual)   1 % (0-3)   


 


Basophils % (Manual)   0 % (0-2)   


 


Band Neutrophils   0 % (0-8)   


 


Nucleated Red Blood Cells   2 /100 WBC   


 


Platelet Estimate   Decreased  L 


 


Platelet Morphology   Normal   


 


Hypochromasia   3+   


 


Anisocytosis   1+   


 


Spherocytes   2+   


 


Reticulocyte Count


  


  


  1.3 %


(0.0-2.0) 


 


 


Haptoglobin   Pending   


 


Sodium Level


  


  


  132 MMOL/L


(136-145)  L 


 


 


Potassium Level


  


  


  7.1 MMOL/L


(3.5-5.1)  *H 


 


 


Chloride Level


  


  


  101 MMOL/L


() 


 


 


Carbon Dioxide Level


  


  


  20 MMOL/L


(21-32)  L 


 


 


Anion Gap


  


  


  12 mmol/L


(5-15) 


 


 


Blood Urea Nitrogen


  


  


  78 mg/dL


(7-18)  H 


 


 


Creatinine


  


  


  8.8 MG/DL


(0.55-1.30)  H 


 


 


Estimat Glomerular Filtration


Rate 


  


  7.6 mL/min


(>60) 


 


 


Glucose Level


  


  


  25 MG/DL


()  *L 


 


 


Hemoglobin A1c


  


  


  6.3 %


(4.3-6.0)  H 


 


 


Uric Acid


  


  


  21.4 MG/DL


(2.6-7.2)  H 


 


 


Calcium Level


  


  


  9.6 MG/DL


(8.5-10.1) 


 


 


Phosphorus Level


  


  


  5.1 MG/DL


(2.5-4.9)  H 


 


 


Magnesium Level


  


  


  1.9 MG/DL


(1.8-2.4) 


 


 


Iron Level


  


  


  84 ug/dL


() 90 ug/dL


()


 


Total Iron Binding Capacity


  


  


  236 ug/dL


(250-450)  L 248 ug/dL


(250-450)  L


 


Percent Iron Saturation   36 % (15-50)   36 % (15-50)  


 


Unsaturated Iron Binding


  


  


  152 ug/dL


(112-346) 158 ug/dL


(112-346)


 


Ferritin


  


  


  494 NG/ML


(8-388)  H 611 NG/ML


(8-388)  H


 


Total Bilirubin


  


  


  0.5 MG/DL


(0.2-1.0) 


 


 


Gamma Glutamyl Transpeptidase


  


  


  314 U/L (5-85)


H 


 


 


Aspartate Amino Transf


(AST/SGOT) 


  


  36 U/L (15-37)


  


 


 


Alanine Aminotransferase


(ALT/SGPT) 


  


  132 U/L


(12-78)  H 


 


 


Alkaline Phosphatase


  


  


  45 U/L


()  L 


 


 


Troponin I


  


  


  0.172 ng/mL


(0.000-0.056) 


 


 


Pro-B-Type Natriuretic Peptide


  


  


  751 pg/mL


(0-125)  H 


 


 


Total Protein


  


  


  5.8 G/DL


(6.4-8.2)  L 


 


 


Albumin


  


  


  3.5 G/DL


(3.4-5.0) 


 


 


Globulin   2.3 g/dL   


 


Albumin/Globulin Ratio   1.5 (1.0-2.7)   


 


Triglycerides Level


  


  


  64 MG/DL


() 


 


 


Cholesterol Level


  


  


  123 MG/DL (<


200) 


 


 


LDL Cholesterol


  


  


  72 mg/dL


(<100) 


 


 


HDL Cholesterol


  


  


  39 MG/DL


(40-60)  L 


 


 


Cholesterol/HDL Ratio


  


  


  3.2 (3.3-4.4)


L 


 


 


Vitamin B12 Level


  


  


  341 PG/ML


(193-986) 353 PG/ML


(193-986)


 


Folate


  


  


  4.9 NG/ML


(8.6-58.9)  L 4.4 NG/ML


(8.6-58.9)  L


 


Thyroid Stimulating Hormone


(TSH) 


  


  0.681 uiU/mL


(0.358-3.740) 


 


 


Lactate Dehydrogenase


  


  


  


  96 U/L


()


 


HIV (1&2) Antibody Rapid


  


  


  


  Negative


(NEGATIVE)


 


Test


  8/14/18


09:50 8/14/18


10:50 8/14/18


12:00 8/14/18


13:35


 


Arterial Blood pH


  7.192


(7.350-7.450) 


  


  7.155


(7.350-7.450)


 


Arterial Blood Partial


Pressure CO2 46.0 mmHg


(35.0-45.0)  H 


  


  48.5 mmHg


(35.0-45.0)  H


 


Arterial Blood Partial


Pressure O2 132.7 mmHg


(75.0-100.0)  H 


  


  84.2 mmHg


(75.0-100.0)


 


Arterial Blood HCO3


  17.3 mmol/L


(22.0-26.0)  L 


  


  16.7 mmol/L


(22.0-26.0)  L


 


Arterial Blood Oxygen


Saturation 97.2 %


(92.0-98.0) 


  


  92.9 %


(92.0-98.0)


 


Arterial Blood Base Excess -10.2     -11.3  


 


Ken Test Positive     Positive  


 


Stool Occult Blood


  


  Negative


(NEGATIVE) 


  


 


 


Troponin I


  


  


  0.083 ng/mL


(0.000-0.056) 


 


 


Hepatitis A IgM Antibody   Pending   


 


Hepatitis B Surface Antigen   Pending   


 


Hepatitis B Core IgM Antibody   Pending   


 


Hepatitis C Antibody   Pending   


 


Test


  8/14/18


19:28 


  


  


 


 


Arterial Blood pH


  7.181


(7.350-7.450) 


  


  


 


 


Arterial Blood Partial


Pressure CO2 40.5 mmHg


(35.0-45.0) 


  


  


 


 


Arterial Blood Partial


Pressure O2 76.7 mmHg


(75.0-100.0) 


  


  


 


 


Arterial Blood HCO3


  14.8 mmol/L


(22.0-26.0)  L 


  


  


 


 


Arterial Blood Oxygen


Saturation 93.1 %


(92.0-98.0) 


  


  


 


 


Arterial Blood Base Excess -12.7     


 


Ken Test Positive     











Microbiology








 Date/Time


Source Procedure


Growth Status


 


 


 8/13/18 19:54


Rectum  Received

















Drew Gomez MD Aug 14, 2018 20:07

## 2018-08-14 NOTE — CONSULTATION
Consult Note


Consult Note


Hematology Consult





ROZINA BRAMBILA: SARAVANAN King


DOS: 8/14/18


RFC: anemia eval, thrombocytopenia





HPI


Mr. Johnson is a very pleasant 58 yo male with hx of severe obesity, ROSAS, CKD, 

and NIDDM.  He presents with severe generalized weakness for 3 weeks.  Worse 

today. Unable to get up and stand on his own.  He has been dieting recently.  

He is only eating vegetables and drinking water.  He also been using laxatives 

which has caused diffuse diarrhea.  He's had bilateral hip pain for several 

weeks since being bedbound.  He denies fever.  Denies abdominal pain.  Denies 

chest pain.  Ct elevated at baseline, pending eval with renal today for 

potential HD





Medications


Amlodipine 10 mg


Aspirin 325 mg


benazepril 20 mg


Glyburide 5 mg


Metformin 750 mg


Isosorbide 10 mg


Albuterol


Allergies:  


Coded Allergies:  


     No Known Allergies (Unverified , 8/13/18)





Nursing Documentation-[facility]


Past Medical History:  No History, Except For


Hx Cardiac Problems:  Yes - CHF


Hx Hypertension:  Yes


Hx Diabetes:  Yes - type 2





 ER ROS - General 


Review of Systems


Constitutional:  Reports: malaise continues to persist


Respiratory:  Denies: cough


Cardiovascular:  Denies: chest pain


Gastrointestinal:  Reports: diarrhea; Denies: abdominal pain, melena, 

hematemesis


Musculoskeletal:  Reports: back pain, muscle pain


All Other Systems:  negative except mentioned in HPI





 ER Physical Exam - General 


Physical Exam





Vital Signs








  Date Time  Temp Pulse Resp B/P (MAP) Pulse Ox O2 Delivery O2 Flow Rate FiO2


 


8/13/18 17:37 98.4 64 16 77/55 99 Room Air  





 98.4       


 


8/13/18 17:45       2.0 


 


8/13/18 19:20        28








Sp02 EP Interpretation:  reviewed, normal


General Appearance:  alert, non-toxic, mild distress, other - pale clammy


Head:  normocephalic, atraumatic


Eyes:  bilateral eye normal inspection, bilateral eye PERRL


ENT:  hearing grossly normal, normal pharynx, no angioedema, normal voice, dry 

mucus membranes


Neck:  full range of motion, supple/symm/no masses


Respiratory:  chest non-tender, lungs clear, normal breath sounds, speaking 

full sentences


Cardiovascular #1:  regular rate, rhythm, no murmur


Cardiovascular #2:  1+ radial (R), 1+ radial (L)


Gastrointestinal:  normal bowel sounds, non tender, soft, no guarding, no 

rebound


Rectal:  normal exam, normal rectal tone


Musculoskeletal:  back normal, normal range of motion, non-tender


Neurologic:  alert, oriented x3, responsive


Psychiatric:  judgement/insight normal


Skin:  normal color, no rash, warm/dry


Lymphatic:  no adenopathy











Labs








Test


  8/13/18


17:55 8/13/18


19:54 8/13/18


21:44


 


White Blood Count


  6.8 K/UL


(4.8-10.8) 


  


 


 


Red Blood Count


  2.80 M/UL


(4.70-6.10) 


  


 


 


Hemoglobin


  8.5 G/DL


(14.2-18.0) 


  


 


 


Hematocrit


  24.3 %


(42.0-52.0) 


  


 


 


Mean Corpuscular Volume 87 FL (80-99)   


 


Mean Corpuscular Hemoglobin


  30.5 PG


(27.0-31.0) 


  


 


 


Mean Corpuscular Hemoglobin


Concent 35.1 G/DL


(32.0-36.0) 


  


 


 


Red Cell Distribution Width


  13.8 %


(11.6-14.8) 


  


 


 


Platelet Count


  60 K/UL


(150-450) 


  


 


 


Mean Platelet Volume


  6.5 FL


(6.5-10.1) 


  


 


 


Neutrophils (%) (Auto)  % (45.0-75.0)   


 


Lymphocytes (%) (Auto)  % (20.0-45.0)   


 


Monocytes (%) (Auto)  % (1.0-10.0)   


 


Eosinophils (%) (Auto)  % (0.0-3.0)   


 


Basophils (%) (Auto)  % (0.0-2.0)   


 


Differential Total Cells


Counted 100 


  


  


 


 


Neutrophils % (Manual) 49 % (45-75)   


 


Lymphocytes % (Manual) 38 % (20-45)   


 


Monocytes % (Manual) 10 % (1-10)   


 


Eosinophils % (Manual) 2 % (0-3)   


 


Basophils % (Manual) 1 % (0-2)   


 


Band Neutrophils 0 % (0-8)   


 


Platelet Estimate Decreased   


 


Platelet Morphology Normal   


 


Hypochromasia 1+   


 


Anisocytosis 1+   


 


Sodium Level


  129 MMOL/L


(136-145) 


  


 


 


Potassium Level


  7.8 MMOL/L


(3.5-5.1) 


  


 


 


Chloride Level


  96 MMOL/L


() 


  


 


 


Carbon Dioxide Level


  21 MMOL/L


(21-32) 


  


 


 


Anion Gap


  11 mmol/L


(5-15) 


  


 


 


Blood Urea Nitrogen


  82 mg/dL


(7-18) 


  


 


 


Creatinine


  9.6 MG/DL


(0.55-1.30) 


  


 


 


Estimat Glomerular Filtration


Rate 6.9 mL/min


(>60) 


  


 


 


Glucose Level


  86 MG/DL


() 


  


 


 


Lactic Acid Level


  1.50 mmol/L


(0.4-2.0) 


  


 


 


Calcium Level


  10.9 MG/DL


(8.5-10.1) 


  


 


 


Total Bilirubin


  0.6 MG/DL


(0.2-1.0) 


  


 


 


Aspartate Amino Transf


(AST/SGOT) 37 U/L (15-37) 


  


  


 


 


Alanine Aminotransferase


(ALT/SGPT) 151 U/L


(12-78) 


  


 


 


Alkaline Phosphatase


  57 U/L


() 


  


 


 


Total Creatine Kinase


  335 U/L


() 


  


 


 


Troponin I


  0.000 ng/mL


(0.000-0.056) 


  


 


 


Total Protein


  6.8 G/DL


(6.4-8.2) 


  


 


 


Albumin


  4.1 G/DL


(3.4-5.0) 


  


 


 


Globulin 2.7 g/dL   


 


Albumin/Globulin Ratio 1.5 (1.0-2.7)   


 


Urine Color  Yellow  


 


Urine Appearance  Clear  


 


Urine pH  5 (4.5-8.0)  


 


Urine Specific Gravity


  


  1.015


(1.005-1.035) 


 


 


Urine Protein  2+ (NEGATIVE)  


 


Urine Glucose (UA)


  


  Negative


(NEGATIVE) 


 


 


Urine Ketones


  


  Negative


(NEGATIVE) 


 


 


Urine Occult Blood  2+ (NEGATIVE)  


 


Urine Nitrite


  


  Negative


(NEGATIVE) 


 


 


Urine Bilirubin  1+ (NEGATIVE)  


 


Urine Ictotest


  


  Negative


(NEGATIVE) 


 


 


Urine Urobilinogen


  


  Normal MG/DL


(0.0-1.0) 


 


 


Urine Leukocyte Esterase  1+ (NEGATIVE)  


 


Urine RBC


  


  5-10 /HPF (0 -


0) 


 


 


Urine WBC


  


  2-4 /HPF (0 -


0) 


 


 


Urine Squamous Epithelial


Cells 


  None /LPF


(NONE/OCC) 


 


 


Urine Bacteria


  


  Few /HPF


(NONE) 


 


 


Arterial Blood pH


  


  


  7.333


(7.350-7.450)


 


Arterial Blood Partial


Pressure CO2 


  


  29.3 mmHg


(35.0-45.0)


 


Arterial Blood Partial


Pressure O2 


  


  40.5 mmHg


(75.0-100.0)


 


Arterial Blood HCO3


  


  


  15.2 mmol/L


(22.0-26.0)


 


Arterial Blood Oxygen


Saturation 


  


  71.9 %


(92.0-98.0)


 


Arterial Blood Base Excess   -9.7 


 


Ken Test   Positive 








EKG Diagnostic Results


Rate:  bradycardiac


Rhythm:  other - sinus bradycardia


ST Segments:  other - right bundle branch block, wide QRS


Other Impression








Assessment and Recs:


# Thrombocytopenia - currently plt at 50k, potentially related to infection 

versus chronic


--> hepatitis and hiv ordered to eval


--> us of the abd ordered ro hsm and cirrhosis


# Anemia of kidney disease/chronic disease


--> anemia panel has been ordered


--> transfuse to hgb >7


--> consider iron and epo depending on what anemia panel shows


--> begin on iv iron for total of 5 doses


--> begin folic acid 1mg po daily


# Acute on chronic renal failure with AIRAM (acute kidney injury)


--> may need HD as per nephrology to eval


# Hyperkalemia has received kayxelate


# Obesity hypoventilation syndrome


# Diabetes


# Morbid obesity





GREATLY APPRECIATE CONSULTATION











Paolo Link MD Aug 14, 2018 10:09

## 2018-08-14 NOTE — NEPHROLOGY PROGRESS NOTE
Assessment/Plan


Problem List:  


(1) AIRAM (acute kidney injury)


(2) Dehydration


(3) Hyperkalemia


(4) Hypotension


(5) ROSAS (obstructive sleep apnea)


(6) Morbid obesity


(7) Anemia


(8) Diabetes


Assessment


acute renal failure- (on Glucophage- Lotensin), Hypotension


underlying chronic renal failure-


morbid obesity-


DM


Hypotension


HyperKalemia


ROSAS


Anemia


Plan


IV fluid-


Dialysis - done once- will do again


ICU setting


Keep BP over 90 syst


Anemia ahuaj, B12- Folate- Iron- Transfuse


avoid nephrotoxics


change IV to D5NS





Subjective


ROS Limited/Unobtainable:  No


Constitutional:  Reports: malaise, weakness





Objective


Objective





Last 24 Hour Vital Signs








  Date Time  Temp Pulse Resp B/P (MAP) Pulse Ox O2 Delivery O2 Flow Rate FiO2


 


8/14/18 08:00  84 18   Venturi Mask 15.0 50


 


8/14/18 08:00      Venturi Mask 15.0 50


 


8/14/18 07:00  82 23 107/50 (69) 100   


 


8/14/18 07:00      Venturi Mask  


 


8/14/18 07:00      Venturi Mask  


 


8/14/18 06:00  81 26 107/51 (69) 100   


 


8/14/18 05:00  76 20 95/32 (53) 100   


 


8/14/18 04:00        50


 


8/14/18 04:00 95.4 74 20 101/36 (57) 100   





 95.4       


 


8/14/18 04:00      Venturi Mask 15.0 


 


8/14/18 03:57  76      


 


8/14/18 03:30  76 20 100/39 (59) 100   


 


8/14/18 03:00  77 17 76/36 (49) 100   


 


8/14/18 02:53      Venturi Mask 13.5 


 


8/14/18 02:44 94.7 79 20 80/39 (53)    





 94.7       


 


8/14/18 02:00  73 17 61/26 (38) 100   


 


8/14/18 01:00  71 16 74/33 (47) 100   


 


8/14/18 00:22  71      


 


8/14/18 00:00  69 15 73/50 (58) 100   


 


8/14/18 00:00        50


 


8/14/18 00:00      Venturi Mask 15.0 


 


8/13/18 23:30  74 22 104/44 (64) 100   


 


8/13/18 23:00 93.9 72 20 99/95 (96)    





 93.9       


 


8/13/18 23:00      Venturi Mask 13.5 


 


8/13/18 23:00  72 19 92/35 (54) 100   


 


8/13/18 22:59      Venturi Mask 15.0 50


 


8/13/18 22:59  66 20   Venturi Mask 15.0 50


 


8/13/18 22:45  73 19 100/44 (62) 100   


 


8/13/18 22:30 94.7 74 20 96/49 (65) 98   





 94.7       


 


8/13/18 22:17  82      


 


8/13/18 22:16 93.9 76 20 84/51 (62) 98   





 93.9       


 


8/13/18 22:15      Nasal Cannula 2.0 


 


8/13/18 22:13 98.4 65 20 95/34 100 Nasal Cannula 2.0 28





 98.4       


 


8/13/18 22:13 98.4 69 20 93/30 100 Nasal Cannula 2.0 28





 98.4       


 


8/13/18 22:10 98.4 69 20 93/30 100 Nasal Cannula 2.0 28





 98.4       


 


8/13/18 19:45 98.4 69 20 93/30 100 Nasal Cannula 2.0 28





 98.4       


 


8/13/18 19:30 98.2 64 22 87/28 100 Nasal Cannula 2.0 28





 98.2       


 


8/13/18 19:27  60 20  100 Nasal Cannula 2.0 28


 


8/13/18 19:21  52 19  100 Nasal Cannula 2.0 28


 


8/13/18 19:20  57 19   Nasal Cannula 2.0 28


 


8/13/18 19:20      Nasal Cannula 2.0 28


 


8/13/18 19:15  57 19 91/38 100 Nasal Cannula 2.0 


 


8/13/18 19:10  57 19 91/43 100 Nasal Cannula 2.0 


 


8/13/18 19:00  60 19 87/37 100 Nasal Cannula 2.0 


 


8/13/18 19:00  58 19 92/43 100 Nasal Cannula 2.0 


 


8/13/18 18:30  58 17 88/56 100 Nasal Cannula 2.0 


 


8/13/18 17:45 98.4 56 16 77/55 100 Nasal Cannula 2.0 





 98.4       


 


8/13/18 17:45  58 19   Nasal Cannula 2.0 


 


8/13/18 17:37 98.4 64 16 77/55 99 Room Air  





 98.4       

















Intake and Output  


 


 8/13/18 8/14/18





 19:00 07:00


 


Intake Total  2110 ml


 


Output Total  120 ml


 


Balance  1990 ml


 


  


 


Intake Oral  200 ml


 


IV Total  1910 ml


 


Output Urine Total  120 ml


 


Hemodialysis UF  0 ml


 


# Voids  1








Laboratory Tests


8/13/18 17:55: 


White Blood Count 6.8, Red Blood Count 2.80L, Hemoglobin 8.5L, Hematocrit 24.3L

, Mean Corpuscular Volume 87, Mean Corpuscular Hemoglobin 30.5, Mean 

Corpuscular Hemoglobin Concent 35.1, Red Cell Distribution Width 13.8, Platelet 

Count 60L, Mean Platelet Volume 6.5, Neutrophils (%) (Auto) , Lymphocytes (%) (

Auto) , Monocytes (%) (Auto) , Eosinophils (%) (Auto) , Basophils (%) (Auto) , 

Differential Total Cells Counted 100, Neutrophils % (Manual) 49, Lymphocytes % (

Manual) 38, Monocytes % (Manual) 10, Eosinophils % (Manual) 2, Basophils % (

Manual) 1, Band Neutrophils 0, Platelet Estimate DecreasedL, Platelet 

Morphology Normal, Hypochromasia 1+, Anisocytosis 1+, Sodium Level 129L, 

Potassium Level 7.8*H, Chloride Level 96L, Carbon Dioxide Level 21, Anion Gap 11

, Blood Urea Nitrogen 82H, Creatinine 9.6H, Estimat Glomerular Filtration Rate 

6.9, Glucose Level 86, Lactic Acid Level 1.50, Calcium Level 10.9H, Total 

Bilirubin 0.6, Aspartate Amino Transf (AST/SGOT) 37, Alanine Aminotransferase (

ALT/SGPT) 151H, Alkaline Phosphatase 57, Total Creatine Kinase 335H, Troponin I 

0.000, Total Protein 6.8, Albumin 4.1, Globulin 2.7, Albumin/Globulin Ratio 1.5


8/13/18 19:54: 


Urine Color Yellow, Urine Appearance Clear, Urine pH 5, Urine Specific Gravity 

1.015, Urine Protein 2+H, Urine Glucose (UA) Negative, Urine Ketones Negative, 

Urine Occult Blood 2+H, Urine Nitrite Negative, Urine Bilirubin 1+H, Urine 

Ictotest Negative, Urine Urobilinogen Normal, Urine Leukocyte Esterase 1+H, 

Urine RBC 5-10H, Urine WBC 2-4, Urine Squamous Epithelial Cells None, Urine 

Bacteria Few


8/13/18 21:44: 


Arterial Blood pH 7.333L, Arterial Blood Partial Pressure CO2 29.3L, Arterial 

Blood Partial Pressure O2 40.5*L, Arterial Blood HCO3 15.2L, Arterial Blood 

Oxygen Saturation 71.9L, Arterial Blood Base Excess -9.7, Ken Test Positive


8/14/18 04:40: 


White Blood Count 6.3, Red Blood Count 2.36L, Hemoglobin 7.0L, Hematocrit 20.2L

, Mean Corpuscular Volume 86, Mean Corpuscular Hemoglobin 29.9, Mean 

Corpuscular Hemoglobin Concent 34.8, Red Cell Distribution Width 13.6, Platelet 

Count 53L, Mean Platelet Volume 6.6, Neutrophils (%) (Auto) , Lymphocytes (%) (

Auto) , Monocytes (%) (Auto) , Eosinophils (%) (Auto) , Basophils (%) (Auto) , 

Differential Total Cells Counted 100, Neutrophils % (Manual) 45, Lymphocytes % (

Manual) 42, Monocytes % (Manual) 12H, Eosinophils % (Manual) 1, Basophils % (

Manual) 0, Band Neutrophils 0, Platelet Estimate DecreasedL, Platelet 

Morphology Normal, Hypochromasia 3+, Anisocytosis 1+, Sodium Level 132L, 

Potassium Level 7.1*H, Chloride Level 101, Carbon Dioxide Level 20L, Anion Gap 

12, Blood Urea Nitrogen 78H, Creatinine 8.8H, Estimat Glomerular Filtration 

Rate 7.6, Glucose Level 25*L, Calcium Level 9.6, Total Bilirubin 0.5, Aspartate 

Amino Transf (AST/SGOT) 36, Alanine Aminotransferase (ALT/SGPT) 132H, Alkaline 

Phosphatase 45L, Troponin I 0.172H, Total Protein 5.8L, Albumin 3.5, Globulin 

2.3, Albumin/Globulin Ratio 1.5, Nucleated Red Blood Cells 2, Spherocytes 2+, 

Reticulocyte Count [Pending], Haptoglobin [Pending], Hemoglobin A1c 6.3H, Uric 

Acid 21.4H, Phosphorus Level 5.1H, Magnesium Level 1.9, Iron Level 84, Total 

Iron Binding Capacity 236L, Percent Iron Saturation 36, Unsaturated Iron 

Binding 152, Ferritin 494H, Gamma Glutamyl Transpeptidase 314H, C-Reactive 

Protein, Quantitative < 0.4, Pro-B-Type Natriuretic Peptide 751H, Triglycerides 

Level 64, Cholesterol Level 123, LDL Cholesterol 72, HDL Cholesterol 39L, 

Cholesterol/HDL Ratio 3.2L, Vitamin B12 Level 341, Folate 4.9L, Thyroid 

Stimulating Hormone (TSH) 0.681


8/14/18 09:00: 


Iron Level 90, Total Iron Binding Capacity 248L, Percent Iron Saturation 36, 

Unsaturated Iron Binding 158, Ferritin [Pending], Lactate Dehydrogenase [Pending

], Vitamin B12 Level [Pending], Folate [Pending], HIV (1&2) Antibody Rapid 

Negative


8/14/18 09:50: 


Arterial Blood pH 7.192*L, Arterial Blood Partial Pressure CO2 46.0H, Arterial 

Blood Partial Pressure O2 132.7H, Arterial Blood HCO3 17.3L, Arterial Blood 

Oxygen Saturation 97.2, Arterial Blood Base Excess -10.2, Ken Test Positive


Height (Feet):  5


Height (Inches):  11.00


Weight (Pounds):  385


General Appearance:  mild distress


Cardiovascular:  normal rate


Respiratory/Chest:  decreased breath sounds


Abdomen:  other - obese


Extremities:  other - no edema











Filippo Herrera MD Aug 14, 2018 11:05

## 2018-08-14 NOTE — EMERGENCY ROOM REPORT
History of Present Illness


General


Chief Complaint:  Abnormal Labs


Source:  Patient, Medical Record





Present Illness


Allergies:  


Coded Allergies:  


     No Known Allergies (Unverified , 8/13/18)





Nursing Documentation-[facility]


Past Medical History:  No History, Except For


Hx Cardiac Problems:  Yes


Hx Hypertension:  Yes


Hx Diabetes:  Yes - TYPE 2


Hx Cancer:  No


Hx Gastrointestinal Problems:  No


Hx Neurological Problems:  No





Physical Exam





Vital Signs








  Date Time  Temp Pulse Resp B/P (MAP) Pulse Ox O2 Delivery O2 Flow Rate FiO2


 


8/13/18 17:37 98.4 64 16 77/55 99 Room Air  





 98.4       


 


8/13/18 17:45       2.0 


 


8/13/18 19:20        28











Procedures


Critical Care Time


Critical Care Time


i.  I feel this is a highly complex case requiring extensive working including 

EKG/Rhythm strip, Xray/CT/US, Blood/urine lab work, repeat exams while in ED, 


and administration of strong opiates/narcotics for pain control, admission to 

hospital or close patient follow up.  





Total time: 30 min bedside evaluation and treatment excludes procedures (EKG). 


Reason for critical care: acidosis, respiratory distress


Possible complications: hypotension, hypertension, MI, shock, arrhythmias, 

metabolic acidosis, end organ damage, respiratory failure. 


Interventions: review of ABG. intubation. review of CXR


Course: Patient presenting with profound acidosis without compensation.  

Difficulty breathing.  On BiPAP.  Decision made to intubate the patient.  Due 

to patient's morbid habitus it was very difficult to visualize airway via 

kaleidoscope or direct visualization.  However I was able to place ET tube.  

Chest x-ray confirms ET tube in place.


Consultations: nursing staff, EMS, family 


Performed by: Dr Chacon


Tolerated well condition = critical





j.  because of unstable vital signs this patient had a condition that could 

potentially threaten life or limb.  I feel this is a critical patient who 

required my full attention while patient was considered critical.  Total 

Critical Care Time excluding procedures was greater than 35 minutes





Intubation


Intubation :  


   Consent:  Emergent


   Intubation Method:  orotracheal


   Tube Size (cm):  8.0


   Medications:  Etomidate, Rocuronium


   Breath Sounds after Intubation:  equal


   Intubation Complications:  no complications


   Post Intubation Xray:  Yes


   Attempts:  One


   Patient Tolerated:  Well


   Complications:  None





Medical Decision Making


Diagnostic Impression:  


 Primary Impression:  


 AIRAM (acute kidney injury)


 Additional Impressions:  


 Morbid obesity


 Anemia


 Diabetes


 Hyponatremia


 Obesity hypoventilation syndrome


ER Course


I was called to the ICU to evaluate this patient.  Patient has profound 

acidosis without compensation.  On BiPAP.  Initial discussion earlier today 

with family and patient was to for intubation and increased the PEEP on BiPAP 

and reassess after dialysis.





After dialysis patient remains acidotic.  Decision was made to intubate the 

patient.  Due to patient's profound morbid obesity was a very difficult airway.

  Difficult to visualize cords via glidescope or direct visualization.  

Ultimately I was able to intubate using direct visualization.  Chest x-ray 

confirms ET tube placement





Last Vital Signs








  Date Time  Temp Pulse Resp B/P (MAP) Pulse Ox O2 Delivery O2 Flow Rate FiO2


 


8/14/18 21:35    79/33    


 


8/14/18 19:19      Bi-pap  25


 


8/14/18 19:00  84 22  95   


 


8/14/18 19:00       20.0 


 


8/14/18 18:40 98.4       





 98.4       








Status:  improved


Disposition:  ADMITTED AS INPATIENT


Condition:  Critical


Referrals:  


ELVIRA NOGUEIRA (PCP)











Kuldip Chacon MD Aug 14, 2018 21:47

## 2018-08-14 NOTE — INFECTIOUS DISEASES PROG NOTE
Assessment/Plan


Problems:  


(1) Septic shock


Assessment & Plan:  with hypotension and respiratory failure, will start 

patient on zosyn and daptomycin pending blood culture results, continue 

hydration and pressors as needed. will deescalate his antibiotics based on his 

culture results 





(2) Dehydration


Assessment & Plan:  continue ivf and transfuse blood products as need for blood 

pressure support 





(3) Hyperkalemia


(4) Diabetes


Assessment & Plan:  recommend tight glycemic control to keep blood glucose 

between 100-140 





(5) AIRAM (acute kidney injury)


Assessment & Plan:  with uremia and metabolic acidosis , started on HD as per 

renal.





(6) Thrombocytopenia


Assessment & Plan:  suspect due to sepsis , monitor platelets, hematology is 

following, avoid heparin products  





(7) Acute respiratory failure with hypoxemia


Assessment & Plan:  due to the above , on BIPAP , still acidotic , pulmonary is 

following , monitor ABG 








Subjective


Allergies:  


Coded Allergies:  


     No Known Allergies (Unverified , 8/13/18)





Objective


Vital Signs





Last 24 Hour Vital Signs








  Date Time  Temp Pulse Resp B/P (MAP) Pulse Ox O2 Delivery O2 Flow Rate FiO2


 


8/14/18 14:52  80 26  97 Facial  25


 


8/14/18 14:00  78 16 90/34 (52) 94   


 


8/14/18 13:06  77 23  100 Bi-pap  25


 


8/14/18 13:00        25


 


8/14/18 13:00  78 16 100/38 (58) 96   


 


8/14/18 12:56  74 22  100 Bi-pap  25


 


8/14/18 12:55  77 22  100 Facial  25


 


8/14/18 12:00        30


 


8/14/18 12:00      Nasal Cannula 2.0 


 


8/14/18 12:00 98.5 79 23 111/29 (56) 98   





 98.5       


 


8/14/18 11:30  77 28  99 Facial  30


 


8/14/18 11:18        30


 


8/14/18 11:00  84 23 103/49 (67) 97   


 


8/14/18 10:00  88 23 117/40 (65) 99   


 


8/14/18 09:00  85 24 104/43 (63) 100   


 


8/14/18 08:00  84 18   Venturi Mask 15.0 50


 


8/14/18 08:00      Venturi Mask 15.0 50


 


8/14/18 08:00      Nasal Cannula 2.0 


 


8/14/18 08:00 98.0 84 22 102/40 (60) 100   





 98.0       


 


8/14/18 07:00  82 23 107/50 (69) 100   


 


8/14/18 07:00      Venturi Mask  


 


8/14/18 07:00      Venturi Mask  


 


8/14/18 06:00  81 26 107/51 (69) 100   


 


8/14/18 05:00  76 20 95/32 (53) 100   


 


8/14/18 04:00        50


 


8/14/18 04:00 95.4 74 20 101/36 (57) 100   





 95.4       


 


8/14/18 04:00      Venturi Mask 15.0 


 


8/14/18 03:57  76      


 


8/14/18 03:30  76 20 100/39 (59) 100   


 


8/14/18 03:00  77 17 76/36 (49) 100   


 


8/14/18 02:53      Venturi Mask 13.5 


 


8/14/18 02:44 94.7 79 20 80/39 (53)    





 94.7       


 


8/14/18 02:00  73 17 61/26 (38) 100   


 


8/14/18 01:00  71 16 74/33 (47) 100   


 


8/14/18 00:22  71      


 


8/14/18 00:00  69 15 73/50 (58) 100   


 


8/14/18 00:00        50


 


8/14/18 00:00      Venturi Mask 15.0 


 


8/13/18 23:30  74 22 104/44 (64) 100   


 


8/13/18 23:00 93.9 72 20 99/95 (96)    





 93.9       


 


8/13/18 23:00      Venturi Mask 13.5 


 


8/13/18 23:00  72 19 92/35 (54) 100   


 


8/13/18 22:59      Venturi Mask 15.0 50


 


8/13/18 22:59  66 20   Venturi Mask 15.0 50


 


8/13/18 22:45  73 19 100/44 (62) 100   


 


8/13/18 22:30 94.7 74 20 96/49 (65) 98   





 94.7       


 


8/13/18 22:17  82      


 


8/13/18 22:16 93.9 76 20 84/51 (62) 98   





 93.9       


 


8/13/18 22:15      Nasal Cannula 2.0 


 


8/13/18 22:13 98.4 65 20 95/34 100 Nasal Cannula 2.0 28





 98.4       


 


8/13/18 22:13 98.4 69 20 93/30 100 Nasal Cannula 2.0 28





 98.4       


 


8/13/18 22:10 98.4 69 20 93/30 100 Nasal Cannula 2.0 28





 98.4       


 


8/13/18 19:45 98.4 69 20 93/30 100 Nasal Cannula 2.0 28





 98.4       


 


8/13/18 19:30 98.2 64 22 87/28 100 Nasal Cannula 2.0 28





 98.2       


 


8/13/18 19:27  60 20  100 Nasal Cannula 2.0 28


 


8/13/18 19:21  52 19  100 Nasal Cannula 2.0 28


 


8/13/18 19:20  57 19   Nasal Cannula 2.0 28


 


8/13/18 19:20      Nasal Cannula 2.0 28


 


8/13/18 19:15  57 19 91/38 100 Nasal Cannula 2.0 


 


8/13/18 19:10  57 19 91/43 100 Nasal Cannula 2.0 


 


8/13/18 19:00  60 19 87/37 100 Nasal Cannula 2.0 


 


8/13/18 19:00  58 19 92/43 100 Nasal Cannula 2.0 


 


8/13/18 18:30  58 17 88/56 100 Nasal Cannula 2.0 


 


8/13/18 17:45 98.4 56 16 77/55 100 Nasal Cannula 2.0 





 98.4       


 


8/13/18 17:45  58 19   Nasal Cannula 2.0 


 


8/13/18 17:37 98.4 64 16 77/55 99 Room Air  





 98.4       








Height (Feet):  5


Height (Inches):  11.00


Weight (Pounds):  385





Microbiology








 Date/Time


Source Procedure


Growth Status


 


 


 8/13/18 19:54


Rectum  Received











Laboratory Tests








Test


  8/13/18


17:55 8/13/18


19:54 8/13/18


21:44 8/14/18


04:00


 


White Blood Count


  6.8 K/UL


(4.8-10.8) 


  


  


 


 


Red Blood Count


  2.80 M/UL


(4.70-6.10)  L 


  


  


 


 


Hemoglobin


  8.5 G/DL


(14.2-18.0)  L 


  


  


 


 


Hematocrit


  24.3 %


(42.0-52.0)  L 


  


  


 


 


Mean Corpuscular Volume 87 FL (80-99)     


 


Mean Corpuscular Hemoglobin


  30.5 PG


(27.0-31.0) 


  


  


 


 


Mean Corpuscular Hemoglobin


Concent 35.1 G/DL


(32.0-36.0) 


  


  


 


 


Red Cell Distribution Width


  13.8 %


(11.6-14.8) 


  


  


 


 


Platelet Count


  60 K/UL


(150-450)  L 


  


  


 


 


Mean Platelet Volume


  6.5 FL


(6.5-10.1) 


  


  


 


 


Neutrophils (%) (Auto)


  % (45.0-75.0)


  


  


  


 


 


Lymphocytes (%) (Auto)


  % (20.0-45.0)


  


  


  


 


 


Monocytes (%) (Auto)  % (1.0-10.0)     


 


Eosinophils (%) (Auto)  % (0.0-3.0)     


 


Basophils (%) (Auto)  % (0.0-2.0)     


 


Differential Total Cells


Counted 100  


  


  


  


 


 


Neutrophils % (Manual) 49 % (45-75)     


 


Lymphocytes % (Manual) 38 % (20-45)     


 


Monocytes % (Manual) 10 % (1-10)     


 


Eosinophils % (Manual) 2 % (0-3)     


 


Basophils % (Manual) 1 % (0-2)     


 


Band Neutrophils 0 % (0-8)     


 


Platelet Estimate Decreased  L   


 


Platelet Morphology Normal     


 


Hypochromasia 1+     


 


Anisocytosis 1+     


 


Sodium Level


  129 MMOL/L


(136-145)  L 


  


  


 


 


Potassium Level


  7.8 MMOL/L


(3.5-5.1)  *H 


  


  


 


 


Chloride Level


  96 MMOL/L


()  L 


  


  


 


 


Carbon Dioxide Level


  21 MMOL/L


(21-32) 


  


  


 


 


Anion Gap


  11 mmol/L


(5-15) 


  


  


 


 


Blood Urea Nitrogen


  82 mg/dL


(7-18)  H 


  


  


 


 


Creatinine


  9.6 MG/DL


(0.55-1.30)  H 


  


  


 


 


Estimat Glomerular Filtration


Rate 6.9 mL/min


(>60) 


  


  


 


 


Glucose Level


  86 MG/DL


() 


  


  


 


 


Lactic Acid Level


  1.50 mmol/L


(0.4-2.0) 


  


  


 


 


Calcium Level


  10.9 MG/DL


(8.5-10.1)  H 


  


  


 


 


Total Bilirubin


  0.6 MG/DL


(0.2-1.0) 


  


  


 


 


Aspartate Amino Transf


(AST/SGOT) 37 U/L (15-37)


  


  


  


 


 


Alanine Aminotransferase


(ALT/SGPT) 151 U/L


(12-78)  H 


  


  


 


 


Alkaline Phosphatase


  57 U/L


() 


  


  


 


 


Total Creatine Kinase


  335 U/L


()  H 


  


  


 


 


Troponin I


  0.000 ng/mL


(0.000-0.056) 


  


  


 


 


Total Protein


  6.8 G/DL


(6.4-8.2) 


  


  


 


 


Albumin


  4.1 G/DL


(3.4-5.0) 


  


  


 


 


Globulin 2.7 g/dL     


 


Albumin/Globulin Ratio 1.5 (1.0-2.7)     


 


Urine Color  Yellow    


 


Urine Appearance  Clear    


 


Urine pH  5 (4.5-8.0)    


 


Urine Specific Gravity


  


  1.015


(1.005-1.035) 


  


 


 


Urine Protein


  


  2+ (NEGATIVE)


H 


  


 


 


Urine Glucose (UA)


  


  Negative


(NEGATIVE) 


  


 


 


Urine Ketones


  


  Negative


(NEGATIVE) 


  


 


 


Urine Occult Blood


  


  2+ (NEGATIVE)


H 


  


 


 


Urine Nitrite


  


  Negative


(NEGATIVE) 


  


 


 


Urine Bilirubin


  


  1+ (NEGATIVE)


H 


  


 


 


Urine Ictotest


  


  Negative


(NEGATIVE) 


  


 


 


Urine Urobilinogen


  


  Normal MG/DL


(0.0-1.0) 


  


 


 


Urine Leukocyte Esterase


  


  1+ (NEGATIVE)


H 


  


 


 


Urine RBC


  


  5-10 /HPF (0 -


0)  H 


  


 


 


Urine WBC


  


  2-4 /HPF (0 -


0) 


  


 


 


Urine Squamous Epithelial


Cells 


  None /LPF


(NONE/OCC) 


  


 


 


Urine Bacteria


  


  Few /HPF


(NONE) 


  


 


 


Arterial Blood pH


  


  


  7.333


(7.350-7.450) 


 


 


Arterial Blood Partial


Pressure CO2 


  


  29.3 mmHg


(35.0-45.0)  L 


 


 


Arterial Blood Partial


Pressure O2 


  


  40.5 mmHg


(75.0-100.0) 


 


 


Arterial Blood HCO3


  


  


  15.2 mmol/L


(22.0-26.0)  L 


 


 


Arterial Blood Oxygen


Saturation 


  


  71.9 %


(92.0-98.0)  L 


 


 


Arterial Blood Base Excess   -9.7   


 


Ken Test   Positive   


 


C-Reactive Protein,


Quantitative 


  


  


  < 0.4 mg/dL


(0.00-0.90)


 


Test


  8/14/18


04:40 8/14/18


09:00 8/14/18


09:50 8/14/18


10:50


 


White Blood Count


  6.3 K/UL


(4.8-10.8) 


  


  


 


 


Red Blood Count


  2.36 M/UL


(4.70-6.10)  L 


  


  


 


 


Hemoglobin


  7.0 G/DL


(14.2-18.0)  L 


  


  


 


 


Hematocrit


  20.2 %


(42.0-52.0)  L 


  


  


 


 


Mean Corpuscular Volume 86 FL (80-99)     


 


Mean Corpuscular Hemoglobin


  29.9 PG


(27.0-31.0) 


  


  


 


 


Mean Corpuscular Hemoglobin


Concent 34.8 G/DL


(32.0-36.0) 


  


  


 


 


Red Cell Distribution Width


  13.6 %


(11.6-14.8) 


  


  


 


 


Platelet Count


  53 K/UL


(150-450)  L 


  


  


 


 


Mean Platelet Volume


  6.6 FL


(6.5-10.1) 


  


  


 


 


Neutrophils (%) (Auto)


  % (45.0-75.0)


  


  


  


 


 


Lymphocytes (%) (Auto)


  % (20.0-45.0)


  


  


  


 


 


Monocytes (%) (Auto)  % (1.0-10.0)     


 


Eosinophils (%) (Auto)  % (0.0-3.0)     


 


Basophils (%) (Auto)  % (0.0-2.0)     


 


Differential Total Cells


Counted 100  


  


  


  


 


 


Neutrophils % (Manual) 45 % (45-75)     


 


Lymphocytes % (Manual) 42 % (20-45)     


 


Monocytes % (Manual) 12 % (1-10)  H   


 


Eosinophils % (Manual) 1 % (0-3)     


 


Basophils % (Manual) 0 % (0-2)     


 


Band Neutrophils 0 % (0-8)     


 


Nucleated Red Blood Cells 2 /100 WBC     


 


Platelet Estimate Decreased  L   


 


Platelet Morphology Normal     


 


Hypochromasia 3+     


 


Anisocytosis 1+     


 


Spherocytes 2+     


 


Reticulocyte Count


  1.3 %


(0.0-2.0) 


  


  


 


 


Haptoglobin Pending     


 


Sodium Level


  132 MMOL/L


(136-145)  L 


  


  


 


 


Potassium Level


  7.1 MMOL/L


(3.5-5.1)  *H 


  


  


 


 


Chloride Level


  101 MMOL/L


() 


  


  


 


 


Carbon Dioxide Level


  20 MMOL/L


(21-32)  L 


  


  


 


 


Anion Gap


  12 mmol/L


(5-15) 


  


  


 


 


Blood Urea Nitrogen


  78 mg/dL


(7-18)  H 


  


  


 


 


Creatinine


  8.8 MG/DL


(0.55-1.30)  H 


  


  


 


 


Estimat Glomerular Filtration


Rate 7.6 mL/min


(>60) 


  


  


 


 


Glucose Level


  25 MG/DL


()  *L 


  


  


 


 


Hemoglobin A1c


  6.3 %


(4.3-6.0)  H 


  


  


 


 


Uric Acid


  21.4 MG/DL


(2.6-7.2)  H 


  


  


 


 


Calcium Level


  9.6 MG/DL


(8.5-10.1) 


  


  


 


 


Phosphorus Level


  5.1 MG/DL


(2.5-4.9)  H 


  


  


 


 


Magnesium Level


  1.9 MG/DL


(1.8-2.4) 


  


  


 


 


Iron Level


  84 ug/dL


() 90 ug/dL


() 


  


 


 


Total Iron Binding Capacity


  236 ug/dL


(250-450)  L 248 ug/dL


(250-450)  L 


  


 


 


Percent Iron Saturation 36 % (15-50)   36 % (15-50)    


 


Unsaturated Iron Binding


  152 ug/dL


(112-346) 158 ug/dL


(112-346) 


  


 


 


Ferritin


  494 NG/ML


(8-388)  H 611 NG/ML


(8-388)  H 


  


 


 


Total Bilirubin


  0.5 MG/DL


(0.2-1.0) 


  


  


 


 


Gamma Glutamyl Transpeptidase


  314 U/L (5-85)


H 


  


  


 


 


Aspartate Amino Transf


(AST/SGOT) 36 U/L (15-37)


  


  


  


 


 


Alanine Aminotransferase


(ALT/SGPT) 132 U/L


(12-78)  H 


  


  


 


 


Alkaline Phosphatase


  45 U/L


()  L 


  


  


 


 


Troponin I


  0.172 ng/mL


(0.000-0.056) 


  


  


 


 


C-Reactive Protein,


Quantitative < 0.4 mg/dL


(0.00-0.90) 


  


  


 


 


Pro-B-Type Natriuretic Peptide


  751 pg/mL


(0-125)  H 


  


  


 


 


Total Protein


  5.8 G/DL


(6.4-8.2)  L 


  


  


 


 


Albumin


  3.5 G/DL


(3.4-5.0) 


  


  


 


 


Globulin 2.3 g/dL     


 


Albumin/Globulin Ratio 1.5 (1.0-2.7)     


 


Triglycerides Level


  64 MG/DL


() 


  


  


 


 


Cholesterol Level


  123 MG/DL (<


200) 


  


  


 


 


LDL Cholesterol


  72 mg/dL


(<100) 


  


  


 


 


HDL Cholesterol


  39 MG/DL


(40-60)  L 


  


  


 


 


Cholesterol/HDL Ratio


  3.2 (3.3-4.4)


L 


  


  


 


 


Vitamin B12 Level


  341 PG/ML


(193-986) 353 PG/ML


(193-986) 


  


 


 


Folate


  4.9 NG/ML


(8.6-58.9)  L 4.4 NG/ML


(8.6-58.9)  L 


  


 


 


Thyroid Stimulating Hormone


(TSH) 0.681 uiU/mL


(0.358-3.740) 


  


  


 


 


Lactate Dehydrogenase


  


  96 U/L


() 


  


 


 


HIV (1&2) Antibody Rapid


  


  Negative


(NEGATIVE) 


  


 


 


Arterial Blood pH


  


  


  7.192


(7.350-7.450) 


 


 


Arterial Blood Partial


Pressure CO2 


  


  46.0 mmHg


(35.0-45.0)  H 


 


 


Arterial Blood Partial


Pressure O2 


  


  132.7 mmHg


(75.0-100.0)  H 


 


 


Arterial Blood HCO3


  


  


  17.3 mmol/L


(22.0-26.0)  L 


 


 


Arterial Blood Oxygen


Saturation 


  


  97.2 %


(92.0-98.0) 


 


 


Arterial Blood Base Excess   -10.2   


 


Ken Test   Positive   


 


Stool Occult Blood


  


  


  


  Negative


(NEGATIVE)


 


Test


  8/14/18


12:00 8/14/18


13:35 


  


 


 


Troponin I


  0.083 ng/mL


(0.000-0.056) 


  


  


 


 


Hepatitis A IgM Antibody Pending     


 


Hepatitis B Surface Antigen Pending     


 


Hepatitis B Core IgM Antibody Pending     


 


Hepatitis C Antibody Pending     


 


Arterial Blood pH


  


  7.155


(7.350-7.450) 


  


 


 


Arterial Blood Partial


Pressure CO2 


  48.5 mmHg


(35.0-45.0)  H 


  


 


 


Arterial Blood Partial


Pressure O2 


  84.2 mmHg


(75.0-100.0) 


  


 


 


Arterial Blood HCO3


  


  16.7 mmol/L


(22.0-26.0)  L 


  


 


 


Arterial Blood Oxygen


Saturation 


  92.9 %


(92.0-98.0) 


  


 


 


Arterial Blood Base Excess  -11.3    


 


Ken Test  Positive    











Current Medications








 Medications


  (Trade)  Dose


 Ordered  Sig/Pet[place]a


 Route


 PRN Reason  Start Time


 Stop Time Status Last Admin


Dose Admin


 


 Albuterol/


 Ipratropium


  (Albuterol/


 Ipratropium)  3 ml  Q4H  PRN


 HHN


 Shortness of Breath  8/14/18 08:30


 8/19/18 08:29   


 


 


 Albuterol/


 Ipratropium


  (Albuterol/


 Ipratropium)  3 ml  Q6HRT


 HHN


   8/14/18 13:00


 8/19/18 12:59  8/14/18 12:56


 


 


 Allopurinol


  (Allopurinol)  300 mg  DAILY


 ORAL


   8/15/18 09:00


 9/14/18 08:59   


 


 


 Chlorhexidine


 Gluconate


  (Brianna-Hex 2%)  1 applic  DAILY@2000


 TOPIC


   8/14/18 20:00


 9/13/18 19:59   


 


 


 Cyanocobalamin


  (Vitamin B12)  1,000 mcg  DAILY


 IM


   8/14/18 13:00


 8/16/18 09:01  8/14/18 14:28


 


 


 Daptomycin 1000


 mg/Sodium Chloride  55 ml @ 


 100 mls/hr  Q48H


 IV


   8/13/18 23:00


 8/20/18 22:59  8/14/18 07:20


 


 


 Dextrose


  (Dextrose 50%)  25 ml  STAT  PRN


 IV


 Hypoglycemia  8/13/18 22:00


 9/12/18 21:59  8/13/18 23:03


 


 


 Dextrose


  (Dextrose 50%)  50 ml  STAT  PRN


 IV


 Hypoglycemia  8/13/18 22:00


 9/12/18 21:59  8/14/18 07:54


 


 


 Dextrose/Sodium


 Chloride  1,000 ml @ 


 200 mls/hr  Q5H


 IV


   8/14/18 11:15


 9/13/18 11:14  8/14/18 11:36


 


 


 Folic Acid


  (Folate)  5 mg  DAILY


 ORAL


   8/15/18 09:00


 9/13/18 09:59   


 


 


 Norepinephrine


 Bitartrate 4 mg/


 Dextrose  250 ml @ 0


 mls/hr  Q24H


 IV


   8/14/18 15:30


 9/13/18 15:29   


 


 


 Pantoprazole


  (Protonix)  40 mg  EVERY 12  HOURS


 IVP


   8/14/18 09:00


 9/13/18 08:59  8/14/18 11:08


 


 


 Piperacillin Sod/


 Tazobactam Sod


 2.25 gm/Sodium


 Chloride  110 ml @ 


 220 mls/hr  Q12H


 IVPB


   8/13/18 22:00


 8/20/18 21:59  8/14/18 11:08


 


 


 Sodium


 Polystyrene


 Sulfonate


  (Kayexalate)  30 gm  ONCE


 ORAL


   8/14/18 18:00


 8/14/18 19:00   


 

















Martin Restrepo M.D. Aug 14, 2018 15:54

## 2018-08-14 NOTE — CARDIOLOGY REPORT
--------------- APPROVED REPORT --------------





EKG Measurement

Heart [id number]

CT 

[id number]

[id number]

[id number]

[id number]





Sinus bradycardia

Right bundle branch block

T wave abnormality, consider lateral ischemia

Abnormal ECG

## 2018-08-14 NOTE — GENERAL PROGRESS NOTE
Assessment/Plan


Assessment/Plan


S: I am very tired





O: appears comfortable. pain is well managed. poor historian. although awake , 

but demonstrate lack of cognition and memory. 





PHYSICAL EXAMINATION: HEENT:  Head is atraumatic and normocephalic.


CHEST:  Clear to auscultation.  No wheezing.  No crackles.


HEART:  S1 and S2.  Regular rate and rhythm.


ABDOMEN:  Soft.  No organomegaly.


MUSCULOSKELETAL:  No gross lateralized motor deficit.


NEUROLOGY:  The patient is awake.  He has decrease and lack in the memory


and lack in the cognition and slow mentation.


MUSCULOSKELETAL:  No gross focal motor deficit. 





Medication : reviewed and reconciled in the chart





ASSESSMENT:


1. Acute encephalopathy


2. Septic shock, source work in progress


2. Diabetes type 2, uncontrolled with low blood sugar.


3. Noncompliance with medication and treatment advise as an outpatient.


4. Acute on chronic anemia.


5. Abnormal liver function tests.


6. Gastrointestinal and deep vein thrombosis prophylaxes.


7. Hyperkalemia.





PLAN OF CARE:  I discussed the care in detail with Dr. Herrera. Proceed with 

HD.  current empirical antibiotic regiments. Remains in very serious medical 

cnd. Endo, Dr Banks consulted and notified.





Subjective


Allergies:  


Coded Allergies:  


     No Known Allergies (Unverified , 8/13/18)





Objective





Last 24 Hour Vital Signs








  Date Time  Temp Pulse Resp B/P (MAP) Pulse Ox O2 Delivery O2 Flow Rate FiO2


 


8/14/18 15:00  78 25 101/71 (81) 87   


 


8/14/18 14:52  80 26  97 Facial  25


 


8/14/18 14:00  78 16 90/34 (52) 94   


 


8/14/18 13:06  77 23  100 Bi-pap  25


 


8/14/18 13:00        25


 


8/14/18 13:00  78 16 100/38 (58) 96   


 


8/14/18 12:56  74 22  100 Bi-pap  25


 


8/14/18 12:55  77 22  100 Facial  25


 


8/14/18 12:00        30


 


8/14/18 12:00  77      


 


8/14/18 12:00      Nasal Cannula 2.0 


 


8/14/18 12:00 98.5 79 23 111/29 (56) 98   





 98.5       


 


8/14/18 11:30  77 28  99 Facial  30


 


8/14/18 11:18        30


 


8/14/18 11:00  84 23 103/49 (67) 97   


 


8/14/18 10:00  88 23 117/40 (65) 99   


 


8/14/18 09:00  85 24 104/43 (63) 100   


 


8/14/18 08:00  84 18   Venturi Mask 15.0 50


 


8/14/18 08:00      Venturi Mask 15.0 50


 


8/14/18 08:00  78      


 


8/14/18 08:00      Nasal Cannula 2.0 


 


8/14/18 08:00 98.0 84 22 102/40 (60) 100   





 98.0       


 


8/14/18 07:00  82 23 107/50 (69) 100   


 


8/14/18 07:00      Venturi Mask  


 


8/14/18 07:00      Venturi Mask  


 


8/14/18 06:00  81 26 107/51 (69) 100   


 


8/14/18 05:00  76 20 95/32 (53) 100   


 


8/14/18 04:00        50


 


8/14/18 04:00 95.4 74 20 101/36 (57) 100   





 95.4       


 


8/14/18 04:00      Venturi Mask 15.0 


 


8/14/18 03:57  76      


 


8/14/18 03:30  76 20 100/39 (59) 100   


 


8/14/18 03:00  77 17 76/36 (49) 100   


 


8/14/18 02:53      Venturi Mask 13.5 


 


8/14/18 02:44 94.7 79 20 80/39 (53)    





 94.7       


 


8/14/18 02:00  73 17 61/26 (38) 100   


 


8/14/18 01:00  71 16 74/33 (47) 100   


 


8/14/18 00:22  71      


 


8/14/18 00:00  69 15 73/50 (58) 100   


 


8/14/18 00:00        50


 


8/14/18 00:00      Venturi Mask 15.0 


 


8/13/18 23:30  74 22 104/44 (64) 100   


 


8/13/18 23:00 93.9 72 20 99/95 (96)    





 93.9       


 


8/13/18 23:00      Venturi Mask 13.5 


 


8/13/18 23:00  72 19 92/35 (54) 100   


 


8/13/18 22:59      Venturi Mask 15.0 50


 


8/13/18 22:59  66 20   Venturi Mask 15.0 50


 


8/13/18 22:45  73 19 100/44 (62) 100   


 


8/13/18 22:30 94.7 74 20 96/49 (65) 98   





 94.7       


 


8/13/18 22:17  82      


 


8/13/18 22:16 93.9 76 20 84/51 (62) 98   





 93.9       


 


8/13/18 22:15      Nasal Cannula 2.0 


 


8/13/18 22:13 98.4 65 20 95/34 100 Nasal Cannula 2.0 28





 98.4       


 


8/13/18 22:13 98.4 69 20 93/30 100 Nasal Cannula 2.0 28





 98.4       


 


8/13/18 22:10 98.4 69 20 93/30 100 Nasal Cannula 2.0 28





 98.4       


 


8/13/18 19:45 98.4 69 20 93/30 100 Nasal Cannula 2.0 28





 98.4       


 


8/13/18 19:30 98.2 64 22 87/28 100 Nasal Cannula 2.0 28





 98.2       


 


8/13/18 19:27  60 20  100 Nasal Cannula 2.0 28


 


8/13/18 19:21  52 19  100 Nasal Cannula 2.0 28


 


8/13/18 19:20  57 19   Nasal Cannula 2.0 28


 


8/13/18 19:20      Nasal Cannula 2.0 28


 


8/13/18 19:15  57 19 91/38 100 Nasal Cannula 2.0 


 


8/13/18 19:10  57 19 91/43 100 Nasal Cannula 2.0 


 


8/13/18 19:00  60 19 87/37 100 Nasal Cannula 2.0 


 


8/13/18 19:00  58 19 92/43 100 Nasal Cannula 2.0 


 


8/13/18 18:30  58 17 88/56 100 Nasal Cannula 2.0 


 


8/13/18 17:45 98.4 56 16 77/55 100 Nasal Cannula 2.0 





 98.4       


 


8/13/18 17:45  58 19   Nasal Cannula 2.0 


 


8/13/18 17:37 98.4 64 16 77/55 99 Room Air  





 98.4       

















Intake and Output  


 


 8/13/18 8/14/18





 19:00 07:00


 


Intake Total  2110 ml


 


Output Total  120 ml


 


Balance  1990 ml


 


  


 


Intake Oral  200 ml


 


IV Total  1910 ml


 


Output Urine Total  120 ml


 


Hemodialysis UF  0 ml


 


# Voids  1








Laboratory Tests


8/13/18 17:55: 


White Blood Count 6.8, Red Blood Count 2.80L, Hemoglobin 8.5L, Hematocrit 24.3L

, Mean Corpuscular Volume 87, Mean Corpuscular Hemoglobin 30.5, Mean 

Corpuscular Hemoglobin Concent 35.1, Red Cell Distribution Width 13.8, Platelet 

Count 60L, Mean Platelet Volume 6.5, Neutrophils (%) (Auto) , Lymphocytes (%) (

Auto) , Monocytes (%) (Auto) , Eosinophils (%) (Auto) , Basophils (%) (Auto) , 

Differential Total Cells Counted 100, Neutrophils % (Manual) 49, Lymphocytes % (

Manual) 38, Monocytes % (Manual) 10, Eosinophils % (Manual) 2, Basophils % (

Manual) 1, Band Neutrophils 0, Platelet Estimate DecreasedL, Platelet 

Morphology Normal, Hypochromasia 1+, Anisocytosis 1+, Sodium Level 129L, 

Potassium Level 7.8*H, Chloride Level 96L, Carbon Dioxide Level 21, Anion Gap 11

, Blood Urea Nitrogen 82H, Creatinine 9.6H, Estimat Glomerular Filtration Rate 

6.9, Glucose Level 86, Lactic Acid Level 1.50, Calcium Level 10.9H, Total 

Bilirubin 0.6, Aspartate Amino Transf (AST/SGOT) 37, Alanine Aminotransferase (

ALT/SGPT) 151H, Alkaline Phosphatase 57, Total Creatine Kinase 335H, Troponin I 

0.000, Total Protein 6.8, Albumin 4.1, Globulin 2.7, Albumin/Globulin Ratio 1.5


8/13/18 19:54: 


Urine Color Yellow, Urine Appearance Clear, Urine pH 5, Urine Specific Gravity 

1.015, Urine Protein 2+H, Urine Glucose (UA) Negative, Urine Ketones Negative, 

Urine Occult Blood 2+H, Urine Nitrite Negative, Urine Bilirubin 1+H, Urine 

Ictotest Negative, Urine Urobilinogen Normal, Urine Leukocyte Esterase 1+H, 

Urine RBC 5-10H, Urine WBC 2-4, Urine Squamous Epithelial Cells None, Urine 

Bacteria Few


8/13/18 21:44: 


Arterial Blood pH 7.333L, Arterial Blood Partial Pressure CO2 29.3L, Arterial 

Blood Partial Pressure O2 40.5*L, Arterial Blood HCO3 15.2L, Arterial Blood 

Oxygen Saturation 71.9L, Arterial Blood Base Excess -9.7, Ken Test Positive


8/14/18 04:00: C-Reactive Protein, Quantitative < 0.4


8/14/18 04:40: 


White Blood Count 6.3, Red Blood Count 2.36L, Hemoglobin 7.0L, Hematocrit 20.2L

, Mean Corpuscular Volume 86, Mean Corpuscular Hemoglobin 29.9, Mean 

Corpuscular Hemoglobin Concent 34.8, Red Cell Distribution Width 13.6, Platelet 

Count 53L, Mean Platelet Volume 6.6, Neutrophils (%) (Auto) , Lymphocytes (%) (

Auto) , Monocytes (%) (Auto) , Eosinophils (%) (Auto) , Basophils (%) (Auto) , 

Differential Total Cells Counted 100, Neutrophils % (Manual) 45, Lymphocytes % (

Manual) 42, Monocytes % (Manual) 12H, Eosinophils % (Manual) 1, Basophils % (

Manual) 0, Band Neutrophils 0, Nucleated Red Blood Cells 2, Platelet Estimate 

DecreasedL, Platelet Morphology Normal, Hypochromasia 3+, Anisocytosis 1+, 

Spherocytes 2+, Reticulocyte Count 1.3, Haptoglobin [Pending], Sodium Level 132L

, Potassium Level 7.1*H, Chloride Level 101, Carbon Dioxide Level 20L, Anion 

Gap 12, Blood Urea Nitrogen 78H, Creatinine 8.8H, Estimat Glomerular Filtration 

Rate 7.6, Glucose Level 25*L, Hemoglobin A1c 6.3H, Uric Acid 21.4H, Calcium 

Level 9.6, Phosphorus Level 5.1H, Magnesium Level 1.9, Iron Level 84, Total 

Iron Binding Capacity 236L, Percent Iron Saturation 36, Unsaturated Iron 

Binding 152, Ferritin 494H, Total Bilirubin 0.5, Gamma Glutamyl Transpeptidase 

314H, Aspartate Amino Transf (AST/SGOT) 36, Alanine Aminotransferase (ALT/SGPT) 

132H, Alkaline Phosphatase 45L, Troponin I 0.172H, C-Reactive Protein, 

Quantitative < 0.4, Pro-B-Type Natriuretic Peptide 751H, Total Protein 5.8L, 

Albumin 3.5, Globulin 2.3, Albumin/Globulin Ratio 1.5, Triglycerides Level 64, 

Cholesterol Level 123, LDL Cholesterol 72, HDL Cholesterol 39L, Cholesterol/HDL 

Ratio 3.2L, Vitamin B12 Level 341, Folate 4.9L, Thyroid Stimulating Hormone (TSH

) 0.681


8/14/18 09:00: 


Iron Level 90, Total Iron Binding Capacity 248L, Percent Iron Saturation 36, 

Unsaturated Iron Binding 158, Ferritin 611H, Vitamin B12 Level 353, Folate 4.4L

, Lactate Dehydrogenase 96, HIV (1&2) Antibody Rapid Negative


8/14/18 09:50: 


Arterial Blood pH 7.192*L, Arterial Blood Partial Pressure CO2 46.0H, Arterial 

Blood Partial Pressure O2 132.7H, Arterial Blood HCO3 17.3L, Arterial Blood 

Oxygen Saturation 97.2, Arterial Blood Base Excess -10.2, Ken Test Positive


8/14/18 10:50: Stool Occult Blood Negative


8/14/18 12:00: 


Troponin I 0.083H, Hepatitis A IgM Antibody [Pending], Hepatitis B Surface 

Antigen [Pending], Hepatitis B Core IgM Antibody [Pending], Hepatitis C 

Antibody [Pending]


8/14/18 13:35: 


Arterial Blood pH 7.155*L, Arterial Blood Partial Pressure CO2 48.5H, Arterial 

Blood Partial Pressure O2 84.2, Arterial Blood HCO3 16.7L, Arterial Blood 

Oxygen Saturation 92.9, Arterial Blood Base Excess -11.3, Ken Test Positive


Height (Feet):  5


Height (Inches):  11.00


Weight (Pounds):  385











David King MD Aug 14, 2018 16:15

## 2018-08-14 NOTE — CARDIOLOGY REPORT
--------------- APPROVED REPORT --------------





EXAM: Two-dimensional and M-mode echocardiogram with Doppler and color Doppler.



M-Mode DIMENSIONS 

IVSd1.5 (0.7-1.1cm)Left Atrium (MM)4.0 (1.6-4.0cm)

LVDd4.4 (3.5-5.6cm)Aortic Root3.3 (2.0-3.7cm)

PWd1.8 (0.7-1.1cm)Aortic Cusp Exc.1.9 (1.5-2.0cm)

IVSs2.3 cm

LVDs2.6 (2.5-4.0cm)

PWs2.1 cm







window 

Normal left ventricular chamber size, systolic function and wall motion to the extent 

visualized 

Left ventricular ejection fraction estimated to be  60-65%.

No evidence of left ventricular hypertrophy .

No evidence  pericardial effusion. 

All other cardiac chamber sizes are within normal limits. 

Focal aortic valve sclerosis with adequate cusp excursion.

Thickened mitral valve leaflets with normal excursion.

Mitral annulus and aortic root calcification.

Pulmonic valve not well visualized.

Normal tricuspid valve structure. 

IVC at size 2.0 with physiologic collapse.



A  color flow and spectral Doppler study was performed and revealed:

No aortic regurgitation.

Trace  mitral regurgitation.

Trace  tricuspid regurgitation.

Tricuspid  systolic velocities suggests peak right ventricular systolic pressure of 

23mmHg

## 2018-08-14 NOTE — OPERATIVE NOTE - DICTATED
DATE OF OPERATION:  08/13/2018



PREOPERATIVE DIAGNOSES:

1. Hypotension.

2. Sepsis.

3. Renal insufficiency.

4. Severe hyperkalemia.

5. Shock.

6. Hypoglycemia.



POSTOPERATIVE DIAGNOSES:

1. Hypotension.

2. Sepsis.

3. Renal insufficiency.

4. Severe hyperkalemia.

5. Shock.

6. Hypoglycemia.



OPERATION PERFORMED:

1. Right internal jugular temporary hemodialysis catheter placement.

2. Right internal jugular triple-lumen central venous catheter

insertion.



ATTENDING SURGEON:  Shane Navarrete M.D.



ASSISTANT:  None.



ANESTHESIOLOGIST:  None.



ANESTHESIA:  Local.



ESTIMATED BLOOD LOSS:  Minimal.



IV FLUIDS:  None.



COMPLICATIONS:  None.



WOUND CLASSIFICATION:  Class I.



SPECIMENS:  None.



INDICATIONS FOR PROCEDURE:  This is a 57-year-old male who was brought in

by [employer] to the emergency department of [facility] with complaints of dizziness, weakness, malaise, fatigue,

and worsening condition and required transport to the emergency department

for evaluation.  The patient was noted to be hypoglycemic initially, which

was managed and following in the emergency department was noted to be

anemic, renal insufficiency with creatinine 9.6 and BUN 82, very

concerning potassium of 7.8 with potential EKG changes, hyponatremia,

hypochloremic, and in a shock state with occasional confusion and

significant discomfort.  The patient was hypotensive with systolic blood

pressure in the 70s and diastolic blood pressures in the 40s in the

emergency department.  Given the above, the patient required, one,

temporary dialysis catheter for urgent dialysis given his hyperkalemia,

renal insufficiency, and potential EKG changes, and, second, a central

venous catheter for fluid resuscitation and pressors.  The patient's body

habitus was morbidly obese and line insertion was very difficult.  Surgery

was called to assist with line placement.  Risks, benefits, and

alternatives were discussed with the patient and his family at

the bedside.  After explaining all the risks, benefits, and alternatives,

consent was obtained.



PROCEDURE NOTE:  The patient was made comfortable at the bedside in the

emergency department for urgent procedure.  Given the patient's body

habitus and above comorbidities, subclavian line was not recommended and

given the patient's habitus, an internal jugular line was chosen as

desired place for position.  Given the patient needed both a hemodialysis

catheter and a triple lumen for the above given reasons, a double stick

was planned for the right neck.  Ultrasound was used and identified a good

right internal jugular vein without any clot or abnormality for insertion.

A time-out was taken.  The right neck was prepped and draped in standard

surgical fashion.  All participants were wearing sterile gloves and gowns,

hairnets, and mask.  Ultrasound probe and sterile cover was placed.  The

right internal jugular was identified and local anesthetic was infiltrated

in the proposed needle stick area points.  Once appropriate anesthetic

effect was achieved, the right internal jugular vein proximally was

cannulated with the finder needle and good venous flow back was

identified.  A guidewire was placed through the needle and the needle

removed.  Guidewire was left in place and under ultrasound guidance was

identified to be in the right internal jugular vein.  Following this, a

more distal portion of the right internal jugular vein was identified and

in a similar fashion using ultrasound guidance, the right internal jugular

vein was cannulated again with a finder needle and a guidewire was placed

over the needle and needle was removed.  Both guidewires were identified

under ultrasound guidance to be within the internal jugular vein.

Following this, a small incision was made around both guidewires.

Dilators were used to dilate the tracts and beginning with the temporary

hemodialysis catheter, it was inserted over the guidewire without

complication.  Guidewire was removed and discarded.  Following this, the

triple-lumen catheter was inserted over guidewire and guidewire was

removed and discarded.  No complication from insertion of either catheter

was identified.  All ports of the hemodialysis and triple-lumen catheter

were aspirated and flushed easily without complication.  The triple-lumen

catheter was saline locked and the hemodialysis catheter was heparin

locked.  Of note, the patient was placed in Trendelenburg position at the

beginning of the procedure.  Once this was completed, both catheters were

sutured to place using the provided 2-0 silk sutures and dressings were

then applied.  Chest x-ray was obtained to identify proper positioning of

the catheters.









  ______________________________________________

  Shane Navarrete M.D.





DR:  Edwin

D:  08/13/2018 21:20

T:  08/14/2018 00:59

JOB#:  [id number]

CC:



GEOFFREY

## 2018-08-14 NOTE — CONSULTATION
DATE OF CONSULTATION:  08/14/2018



INFECTIOUS DISEASE CONSULTATION



CONSULTING PHYSICIAN:  Martin Restrepo M.D.



REQUESTING PHYSICIAN:  David King M.D.



REASON FOR CONSULTATION:  Hypertension, urinary tract infection, and

possible septic shock.  Recommendation for antibiotics treatment with

respiratory failure and advanced renal failure.



HISTORY OF PRESENT ILLNESS:  The patient is a 57-year-old male with past

medical history of morbid obesity, obstructive sleep apnea, on BiPAP at

home, chronic kidney disease, and diabetes with complication, who

presented with generalized weakness and respiratory distress to the

emergency room at [facility].  The patient has been dieting

recently with eating vegetables and drinking water and using laxative,

which has caused massive diarrhea and subsequently dehydration.  The

patient was found to be hypotensive in the emergency room with blood

pressure of 77/55 and pulse of 64 with hypoxemia.  Urinalysis showed

evidence of urinary tract infection.  So, the patient was given

antibiotics and Infectious Disease consultation was requested for

antibiotics treatment and further management.  As of note, the patient is

on the BiPAP machine with mask on and could not provide good history.

History was mainly obtained from the medical record and the nursing

staff.



PAST MEDICAL HISTORY:  Significant for morbid obesity, obstructive sleep

apnea, on BiPAP, chronic kidney disease, noninsulin-dependant diabetes,

congestive heart failure, and hypertension.



PAST SURGICAL HISTORY:  Not on record.



MEDICATIONS:  The patient received cefepime in the emergency room.  For the

rest of his medications, please refer to MAR.



ALLERGIES:  He has no known drug allergy.



FAMILY HISTORY:  Not contributory.



SOCIAL HISTORY:  The patient lives at home with wife.  Denied using any

drugs, tobacco, or alcohol.



REVIEW OF SYSTEMS:  Unable to obtain.  The patient cannot provide history

at this point due to BiPAP mask.



PHYSICAL EXAMINATION:

VITAL SIGNS:  Temperature 98.5, pulse 79, respirations 23, blood pressure

111/29, and saturation 98 on facial mask with FiO2 of 25%.

GENERAL:  A middle-aged male, morbidly obese, lying in bed, on BiPAP

machine mask.  Awake, alert, responsive, and not in acute distress.

HEENT:  Normocephalic and atraumatic.  Pupils are reactive to light.

Unable to assess oral mucosa due to the mask.

NECK:  Supple.  No lymphadenopathy.

CARDIOVASCULAR:  Regular rate and rhythm.  No murmur or gallop.

LUNGS:  He had wheezing with diminished breathing sounds at the bases and

crackles.  Poor air entry.

ABDOMEN:  Soft and morbidly obese with generalized tenderness.

EXTREMITY:  Trace edema.  No cyanosis or clubbing.



LABORATORY DATA:  Labs showed white count of 6.3, hemoglobin of 7, and

platelet count of 53,000.  BUN 78, creatinine 8.8, potassium 7.1, and

glucose 25.  Ferritin 494.  .  Urinalysis showed a +1 leukocyte

esterase, 5 to 10 red blood cells, 2 to 4 wbc, and few bacteria.



SEROLOGY:  HIV screening negative.  Hepatitis panel is pending.



IMAGING:  Chest x-ray on admission showed left pleural effusion.  Hazy

apparent parenchymal opacity could be artifact.



Repeated chest x-ray on 08/13/2018 showed satisfactory positions of

right jugular central venous catheter and right jugular temporary dialysis

catheter.



Renal ultrasound showed mild right renal collecting system fullness

without bryn hydronephrosis, no left hydronephrosis, and empty bladder

with Danielson catheter.



ASSESSMENT AND RECOMMENDATION:

1. Septic shock with hypotension and respiratory failure.  We will start

the patient on Zosyn and daptomycin, empiric coverage pending blood

culture results.  Continue hydration and pressor as needed.  We will

deescalate his antibiotics based on his culture results.

2. Dehydration.  Continue intravenous fluids and blood transfusion

products as needed to support his blood pressure.  Monitor electrolytes.

3. Hyperkalemia due to advanced renal failure.  Required hemodialysis.

Monitor potassium.  Repeat level.

4. Diabetes.  Recommend tight glycemic control to keep blood glucose

between 100 to 140.

5. Acute renal failure, advanced with end-stage renal disease and uremia

with metabolic acidosis.  Started on hemodialysis as per Renal service.

Monitor urine output and daily weight.

6. Thrombocytopenia suspect due to sepsis.  Monitor platelets.

Hematology is following.  Avoid heparin product.

7. Acute respiratory failure with hypoxemia due to the above, on BiPAP

machine, still acidotic.  Pulmonary team is following.  Monitor ABG and

chest x-ray.



Thank you for the consult.  ID will continue to follow.  Please feel

free to call with any questions.









  ______________________________________________

  Martin Restrepo M.D.





DR:  SALAZAR

D:  08/14/2018 16:04

T:  08/14/2018 20:03

JOB#:  [id number]

CC:

## 2018-08-14 NOTE — DIAGNOSTIC IMAGING REPORT
Indication: Acute renal failure

 

Technique: Grayscale and duplex images of the kidneys, retroperitoneum, and bladder

were obtained.

 

Comparison: none            

 

Findings: Exam is somewhat limited due to patient body habitus. Right kidney measures

12 cm in length. Left kidney measures 11.5 cm in length. Both kidneys demonstrate

normal echogenicity. There is mild fullness to the right renal collecting system but

no bryn hydronephrosis. No left hydronephrosis.  There is a 2.4 cm cyst in the right

upper pole. Normal inferior vena cava. Bladder is empty, contains a Danielson catheter. 

 

Incidentally noted are gallstones

 

Impression: Mild right renal collecting system fullness without bryn hydronephrosis

 

No left hydronephrosis

 

Empty bladder with a Danielson catheter

 

Incidental finding right upper pole renal cyst

 

Gallstones incidentally noted.

## 2018-08-14 NOTE — PULMONOLGY CRITICAL CARE NOTE
Critical Care - Asmt/Plan


Problems:  


(1) HTN (hypertension)


(2) Diabetes


(3) Morbid obesity


(4) Obesity hypoventilation syndrome


(5) ROSAS (obstructive sleep apnea)


(6) Dehydration


(7) Hypotension


(8) Hyponatremia


(9) Hyperkalemia


(10) AIRAM (acute kidney injury)


(11) Anemia


(12) Hypoglycemia


(13) Thrombocytopenia


Respiratory:  monitor respiratory rate, adjust FIO2 - Titrate to keep SaO2 ~ 90

, CXR, ABG, other - BiPAP 12/5 qHS and PRN, RTC and PRN HHN's


Cardiac:  other - F/U TTe


Renal:  keep IV fluid, check electrolytes, other - iHD per renal


Infectious Disease:  continue antibiotics - per ID, F/U Cx's


Gastrointestinal:  start feedings - PO feeding, aspiration precautions


Endocrine:  monitor blood sugar, continue sliding scale insulin


Hematologic:  monitor H/H, other - Check LDH, haptoglobin, retic, Fe panel, 

ferring, duplex bLE


Prophylaxis:  Protonix, SCDs


Disposition:  keep in ICU


Time Spent (Minutes):  50


Notes Reviewed:  renal


Discussed with:  nurses, consultants





Critical Care - Objective





Last 24 Hour Vital Signs








  Date Time  Temp Pulse Resp B/P (MAP) Pulse Ox O2 Delivery O2 Flow Rate FiO2


 


8/14/18 07:00  82 23 107/50 (69) 100   


 


8/14/18 06:00  81 26 107/51 (69) 100   


 


8/14/18 05:00  76 20 95/32 (53) 100   


 


8/14/18 04:00        50


 


8/14/18 04:00 95.4 74 20 101/36 (57) 100   





 95.4       


 


8/14/18 04:00      Venturi Mask 15.0 


 


8/14/18 03:57  76      


 


8/14/18 03:30  76 20 100/39 (59) 100   


 


8/14/18 03:00  77 17 76/36 (49) 100   


 


8/14/18 02:53      Venturi Mask 13.5 


 


8/14/18 02:44 94.7 79 20 80/39 (53)    





 94.7       


 


8/14/18 02:00  73 17 61/26 (38) 100   


 


8/14/18 01:00  71 16 74/33 (47) 100   


 


8/14/18 00:22  71      


 


8/14/18 00:00  69 15 73/50 (58) 100   


 


8/14/18 00:00        50


 


8/14/18 00:00      Venturi Mask 15.0 


 


8/13/18 23:30  74 22 104/44 (64) 100   


 


8/13/18 23:00 93.9 72 20 99/95 (96)    





 93.9       


 


8/13/18 23:00      Venturi Mask 13.5 


 


8/13/18 23:00  72 19 92/35 (54) 100   


 


8/13/18 22:59      Venturi Mask 15.0 50


 


8/13/18 22:59  66 20   Venturi Mask 15.0 50


 


8/13/18 22:45  73 19 100/44 (62) 100   


 


8/13/18 22:30 94.7 74 20 96/49 (65) 98   





 94.7       


 


8/13/18 22:17  82      


 


8/13/18 22:16 93.9 76 20 84/51 (62) 98   





 93.9       


 


8/13/18 22:15      Nasal Cannula 2.0 


 


8/13/18 22:13 98.4 65 20 95/34 100 Nasal Cannula 2.0 28





 98.4       


 


8/13/18 22:13 98.4 69 20 93/30 100 Nasal Cannula 2.0 28





 98.4       


 


8/13/18 22:10 98.4 69 20 93/30 100 Nasal Cannula 2.0 28





 98.4       


 


8/13/18 19:45 98.4 69 20 93/30 100 Nasal Cannula 2.0 28





 98.4       


 


8/13/18 19:30 98.2 64 22 87/28 100 Nasal Cannula 2.0 28





 98.2       


 


8/13/18 19:27  60 20  100 Nasal Cannula 2.0 28


 


8/13/18 19:21  52 19  100 Nasal Cannula 2.0 28


 


8/13/18 19:20  57 19   Nasal Cannula 2.0 28


 


8/13/18 19:20      Nasal Cannula 2.0 28


 


8/13/18 19:15  57 19 91/38 100 Nasal Cannula 2.0 


 


8/13/18 19:10  57 19 91/43 100 Nasal Cannula 2.0 


 


8/13/18 19:00  60 19 87/37 100 Nasal Cannula 2.0 


 


8/13/18 19:00  58 19 92/43 100 Nasal Cannula 2.0 


 


8/13/18 18:30  58 17 88/56 100 Nasal Cannula 2.0 


 


8/13/18 17:45 98.4 56 16 77/55 100 Nasal Cannula 2.0 





 98.4       


 


8/13/18 17:45  58 19   Nasal Cannula 2.0 


 


8/13/18 17:37 98.4 64 16 77/55 99 Room Air  





 98.4       








Status:  awake, other - morbidly obese


Condition:  improving


HEENT:  atraumatic, normocephalic


Lungs:  clear


Heart:  HR/BP stable


Abdomen:  soft, non-tender, active bowel sounds


Extremities:  no C/C/E


Micro:





Microbiology








 Date/Time


Source Procedure


Growth Status


 


 


 8/13/18 19:54


Rectum  Received








Accucheck:  49


Blood Sugars:  BS not controlled





Critical Care - Subjective


ROS Limited/Unobtainable:  Yes


ICU Day:  2


Intubation Day:  N/A


Interval Events:


S/P HD net 0


BS this am 49


HH and plt lower


Condition:  improving


IV Access:  central - R IJ Mahurker


EKG Rhythm:  Sinus Rhythm


FI02:  50


Sputum Amount:  None


Fluids:  NS@200


I&O:











Intake and Output  


 


 8/13/18 8/14/18





 19:00 07:00


 


Intake Total  2110 ml


 


Output Total  120 ml


 


Balance  1990 ml


 


  


 


Intake Oral  200 ml


 


IV Total  1910 ml


 


Output Urine Total  120 ml


 


Hemodialysis UF  0 ml


 


# Voids  1








Subjective:


Feels better, less SOB, no cough, no SOB, no CP, no F/C, no N/V/D/C, no abd 

pain or urinary complaints


Labs:





Laboratory Tests








Test


  8/13/18


17:55 8/13/18


19:54 8/13/18


21:44 8/14/18


04:40


 


White Blood Count


  6.8 K/UL


(4.8-10.8) 


  


  6.3 K/UL


(4.8-10.8)


 


Red Blood Count


  2.80 M/UL


(4.70-6.10)  L 


  


  2.36 M/UL


(4.70-6.10)  L


 


Hemoglobin


  8.5 G/DL


(14.2-18.0)  L 


  


  7.0 G/DL


(14.2-18.0)  L


 


Hematocrit


  24.3 %


(42.0-52.0)  L 


  


  20.2 %


(42.0-52.0)  L


 


Mean Corpuscular Volume 87 FL (80-99)     86 FL (80-99)  


 


Mean Corpuscular Hemoglobin


  30.5 PG


(27.0-31.0) 


  


  29.9 PG


(27.0-31.0)


 


Mean Corpuscular Hemoglobin


Concent 35.1 G/DL


(32.0-36.0) 


  


  34.8 G/DL


(32.0-36.0)


 


Red Cell Distribution Width


  13.8 %


(11.6-14.8) 


  


  13.6 %


(11.6-14.8)


 


Platelet Count


  60 K/UL


(150-450)  L 


  


  53 K/UL


(150-450)  L


 


Mean Platelet Volume


  6.5 FL


(6.5-10.1) 


  


  6.6 FL


(6.5-10.1)


 


Neutrophils (%) (Auto)


  % (45.0-75.0)


  


  


  % (45.0-75.0)


 


 


Lymphocytes (%) (Auto)


  % (20.0-45.0)


  


  


  % (20.0-45.0)


 


 


Monocytes (%) (Auto)  % (1.0-10.0)      % (1.0-10.0)  


 


Eosinophils (%) (Auto)  % (0.0-3.0)      % (0.0-3.0)  


 


Basophils (%) (Auto)  % (0.0-2.0)      % (0.0-2.0)  


 


Differential Total Cells


Counted 100  


  


  


  100  


 


 


Neutrophils % (Manual) 49 % (45-75)     45 % (45-75)  


 


Lymphocytes % (Manual) 38 % (20-45)     42 % (20-45)  


 


Monocytes % (Manual) 10 % (1-10)     12 % (1-10)  H


 


Eosinophils % (Manual) 2 % (0-3)     1 % (0-3)  


 


Basophils % (Manual) 1 % (0-2)     0 % (0-2)  


 


Band Neutrophils 0 % (0-8)     0 % (0-8)  


 


Platelet Estimate Decreased  L   Decreased  L


 


Platelet Morphology Normal     Normal  


 


Hypochromasia 1+     3+  


 


Anisocytosis 1+     1+  


 


Sodium Level


  129 MMOL/L


(136-145)  L 


  


  132 MMOL/L


(136-145)  L


 


Potassium Level


  7.8 MMOL/L


(3.5-5.1)  *H 


  


  7.1 MMOL/L


(3.5-5.1)  *H


 


Chloride Level


  96 MMOL/L


()  L 


  


  101 MMOL/L


()


 


Carbon Dioxide Level


  21 MMOL/L


(21-32) 


  


  20 MMOL/L


(21-32)  L


 


Anion Gap


  11 mmol/L


(5-15) 


  


  12 mmol/L


(5-15)


 


Blood Urea Nitrogen


  82 mg/dL


(7-18)  H 


  


  78 mg/dL


(7-18)  H


 


Creatinine


  9.6 MG/DL


(0.55-1.30)  H 


  


  8.8 MG/DL


(0.55-1.30)  H


 


Estimat Glomerular Filtration


Rate 6.9 mL/min


(>60) 


  


  7.6 mL/min


(>60)


 


Glucose Level


  86 MG/DL


() 


  


  25 MG/DL


()  *L


 


Lactic Acid Level


  1.50 mmol/L


(0.4-2.0) 


  


  


 


 


Calcium Level


  10.9 MG/DL


(8.5-10.1)  H 


  


  9.6 MG/DL


(8.5-10.1)


 


Total Bilirubin


  0.6 MG/DL


(0.2-1.0) 


  


  0.5 MG/DL


(0.2-1.0)


 


Aspartate Amino Transf


(AST/SGOT) 37 U/L (15-37)


  


  


  36 U/L (15-37)


 


 


Alanine Aminotransferase


(ALT/SGPT) 151 U/L


(12-78)  H 


  


  132 U/L


(12-78)  H


 


Alkaline Phosphatase


  57 U/L


() 


  


  45 U/L


()  L


 


Total Creatine Kinase


  335 U/L


()  H 


  


  


 


 


Troponin I


  0.000 ng/mL


(0.000-0.056) 


  


  0.172 ng/mL


(0.000-0.056)


 


Total Protein


  6.8 G/DL


(6.4-8.2) 


  


  5.8 G/DL


(6.4-8.2)  L


 


Albumin


  4.1 G/DL


(3.4-5.0) 


  


  3.5 G/DL


(3.4-5.0)


 


Globulin 2.7 g/dL     2.3 g/dL  


 


Albumin/Globulin Ratio 1.5 (1.0-2.7)     1.5 (1.0-2.7)  


 


Urine Color  Yellow    


 


Urine Appearance  Clear    


 


Urine pH  5 (4.5-8.0)    


 


Urine Specific Gravity


  


  1.015


(1.005-1.035) 


  


 


 


Urine Protein


  


  2+ (NEGATIVE)


H 


  


 


 


Urine Glucose (UA)


  


  Negative


(NEGATIVE) 


  


 


 


Urine Ketones


  


  Negative


(NEGATIVE) 


  


 


 


Urine Occult Blood


  


  2+ (NEGATIVE)


H 


  


 


 


Urine Nitrite


  


  Negative


(NEGATIVE) 


  


 


 


Urine Bilirubin


  


  1+ (NEGATIVE)


H 


  


 


 


Urine Ictotest


  


  Negative


(NEGATIVE) 


  


 


 


Urine Urobilinogen


  


  Normal MG/DL


(0.0-1.0) 


  


 


 


Urine Leukocyte Esterase


  


  1+ (NEGATIVE)


H 


  


 


 


Urine RBC


  


  5-10 /HPF (0 -


0)  H 


  


 


 


Urine WBC


  


  2-4 /HPF (0 -


0) 


  


 


 


Urine Squamous Epithelial


Cells 


  None /LPF


(NONE/OCC) 


  


 


 


Urine Bacteria


  


  Few /HPF


(NONE) 


  


 


 


Arterial Blood pH


  


  


  7.333


(7.350-7.450) 


 


 


Arterial Blood Partial


Pressure CO2 


  


  29.3 mmHg


(35.0-45.0)  L 


 


 


Arterial Blood Partial


Pressure O2 


  


  40.5 mmHg


(75.0-100.0) 


 


 


Arterial Blood HCO3


  


  


  15.2 mmol/L


(22.0-26.0)  L 


 


 


Arterial Blood Oxygen


Saturation 


  


  71.9 %


(92.0-98.0)  L 


 


 


Arterial Blood Base Excess   -9.7   


 


Ken Test   Positive   


 


Nucleated Red Blood Cells    2 /100 WBC  


 


Spherocytes    2+  


 


Hemoglobin A1c


  


  


  


  6.3 %


(4.3-6.0)  H


 


Uric Acid


  


  


  


  21.4 MG/DL


(2.6-7.2)  H


 


Phosphorus Level


  


  


  


  5.1 MG/DL


(2.5-4.9)  H


 


Magnesium Level


  


  


  


  1.9 MG/DL


(1.8-2.4)


 


Iron Level


  


  


  


  84 ug/dL


()


 


Total Iron Binding Capacity


  


  


  


  236 ug/dL


(250-450)  L


 


Percent Iron Saturation    36 % (15-50)  


 


Unsaturated Iron Binding


  


  


  


  152 ug/dL


(112-346)


 


Ferritin


  


  


  


  494 NG/ML


(8-388)  H


 


Gamma Glutamyl Transpeptidase


  


  


  


  314 U/L (5-85)


H


 


C-Reactive Protein,


Quantitative 


  


  


  < 0.4 mg/dL


(0.00-0.90)


 


Pro-B-Type Natriuretic Peptide


  


  


  


  751 pg/mL


(0-125)  H


 


Triglycerides Level


  


  


  


  64 MG/DL


()


 


Cholesterol Level


  


  


  


  123 MG/DL (<


200)


 


LDL Cholesterol


  


  


  


  72 mg/dL


(<100)


 


HDL Cholesterol


  


  


  


  39 MG/DL


(40-60)  L


 


Cholesterol/HDL Ratio


  


  


  


  3.2 (3.3-4.4)


L


 


Vitamin B12 Level


  


  


  


  341 PG/ML


(193-986)


 


Folate


  


  


  


  4.9 NG/ML


(8.6-58.9)  L


 


Thyroid Stimulating Hormone


(TSH) 


  


  


  0.681 uiU/mL


(0.358-3.740)

















Tristan Aceves MD Aug 14, 2018 08:30

## 2018-08-14 NOTE — DIAGNOSTIC IMAGING REPORT
Indication: Shortness of breath

 

Technique: One view of the chest

 

Comparison: none

 

Findings: Body habitus limits evaluation. The patient is somewhat rotated. Apparent

hazy opacities of both hemithoraces, right greater than left, may reflect pulmonary

edema or may just be artifact of overlying soft tissue there does appear to be

pleural fluid on the left.

 

Impression: Probable left pleural effusion

 

Hazy apparent parenchymal opacity, could be an artifact of overlying soft tissue.

Correlate with clinical findings

## 2018-08-14 NOTE — HISTORY & PHYSICAL
History and Physical


History & Physicial


seen and examined. Full Dictation in progress











David King MD Aug 14, 2018 16:15

## 2018-08-14 NOTE — EMERGENCY ROOM REPORT
History of Present Illness


General


Chief Complaint:  Abnormal Labs


Source:  EMS





Present Illness


HPI


Mr. Johnson is a very pleasant 56 yo male with hx of severe obesity, ROSAS, CKD, 

and NIDDM.  He presents with severe generalized weakness for 3 weeks.  Worse 

today.





Unable to get up and stand on his own.  He has been dieting recently.  He is 

only eating vegetables and drinking water.  He also been using laxatives which 

has caused diffuse diarrhea.  He's had bilateral hip pain for several weeks 

since being bedbound.  He denies fever.  Denies abdominal pain.  Denies chest 

pain.  





I spoke extensively with PCP Dr. Nogueira [phone] who gave a comprehensive 

medical history.  Patient has normal ejection fraction according to his report.

  His creatinine level ranges from 2-3.





Medications


Amlodipine 10 mg


Aspirin 325 mg


benazepril 20 mg


Glyburide 5 mg


Metformin 750 mg


Isosorbide 10 mg


Albuterol


Allergies:  


Coded Allergies:  


     No Known Allergies (Unverified , 8/13/18)





Nursing Documentation-[facility]


Past Medical History:  No History, Except For


Hx Cardiac Problems:  Yes - CHF


Hx Hypertension:  Yes


Hx Diabetes:  Yes - type 2





Review of Systems


Constitutional:  Reports: malaise


Respiratory:  Denies: cough


Cardiovascular:  Denies: chest pain


Gastrointestinal:  Reports: diarrhea; Denies: abdominal pain, melena, 

hematemesis


Musculoskeletal:  Reports: back pain, muscle pain


All Other Systems:  negative except mentioned in HPI





Physical Exam





Vital Signs








  Date Time  Temp Pulse Resp B/P (MAP) Pulse Ox O2 Delivery O2 Flow Rate FiO2


 


8/13/18 17:37 98.4 64 16 77/55 99 Room Air  





 98.4       


 


8/13/18 17:45       2.0 


 


8/13/18 19:20        28








Sp02 EP Interpretation:  reviewed, normal


General Appearance:  alert, non-toxic, mild distress, other - pale clammy


Head:  normocephalic, atraumatic


Eyes:  bilateral eye normal inspection, bilateral eye PERRL


ENT:  hearing grossly normal, normal pharynx, no angioedema, normal voice, dry 

mucus membranes


Neck:  full range of motion, supple/symm/no masses


Respiratory:  chest non-tender, lungs clear, normal breath sounds, speaking 

full sentences


Cardiovascular #1:  regular rate, rhythm, no murmur


Cardiovascular #2:  1+ radial (R), 1+ radial (L)


Gastrointestinal:  normal bowel sounds, non tender, soft, no guarding, no 

rebound


Rectal:  normal exam, normal rectal tone, heme negative stool, other - brown 

stool


Musculoskeletal:  back normal, normal range of motion, non-tender


Neurologic:  alert, oriented x3, responsive, motor strength/tone normal, 

sensory intact, speech normal


Psychiatric:  judgement/insight normal, memory normal, mood/affect normal


Skin:  normal color, no rash, warm/dry, other - no ulcers no skin break down


Lymphatic:  no adenopathy





Procedures


Critical Care Time


Critical Care Time


100 minutes of critical care time excluding procedures were used in the care of 

the patient.  I reviewed labs and imaging.  I reviewed previous electronic 

medical record.  I updated wife at the bedside.  I spoke with multiple 

consultants.  Patient required multiple reassessments.





Medical Decision Making


Diagnostic Impression:  


 Primary Impression:  


 AIRAM (acute kidney injury)


 Additional Impressions:  


 Obesity hypoventilation syndrome


 Anemia


 Diabetes


 Hyponatremia


 Morbid obesity


ER Course


Mr. Johnson had a prolonged ER course.  I spoke with Dr. Nogueira PMEDISON upon arrival 

gave extensive medical history on patient.  Patient had persistent hypotension 

for which I felt was due to hypovolemia with history of decreased by mouth 

intake and laxative use as well as diarrhea.  No evidence of GI bleeding.  

Hemoccult negative brown stool on exam.  No indication of cardiac disease.  Did 

have EKG abnormalities with widened QRS bradycardia and prolonged AZ interval.  

I felt the EKG changes were due to hyperkalemia.  Patient was treated 

appropriately for hyperkalemia.  Still he had persistent hypotension.  He 

received total 2 L normal saline with slight improvement of blood pressure.  I 

highly appreciate the assistance of multiple consultants who evaluated patient 

and provided excellent care.  These physicians include Dr. Navarrete, Dr. Aceves, Dr. Fouladin and Dr. King.





Mr. Johnson is admitted to ICU in criticial condition.





Labs








Test


  8/13/18


17:55 8/13/18


19:54 8/13/18


21:44


 


White Blood Count


  6.8 K/UL


(4.8-10.8) 


  


 


 


Red Blood Count


  2.80 M/UL


(4.70-6.10) 


  


 


 


Hemoglobin


  8.5 G/DL


(14.2-18.0) 


  


 


 


Hematocrit


  24.3 %


(42.0-52.0) 


  


 


 


Mean Corpuscular Volume 87 FL (80-99)   


 


Mean Corpuscular Hemoglobin


  30.5 PG


(27.0-31.0) 


  


 


 


Mean Corpuscular Hemoglobin


Concent 35.1 G/DL


(32.0-36.0) 


  


 


 


Red Cell Distribution Width


  13.8 %


(11.6-14.8) 


  


 


 


Platelet Count


  60 K/UL


(150-450) 


  


 


 


Mean Platelet Volume


  6.5 FL


(6.5-10.1) 


  


 


 


Neutrophils (%) (Auto)  % (45.0-75.0)   


 


Lymphocytes (%) (Auto)  % (20.0-45.0)   


 


Monocytes (%) (Auto)  % (1.0-10.0)   


 


Eosinophils (%) (Auto)  % (0.0-3.0)   


 


Basophils (%) (Auto)  % (0.0-2.0)   


 


Differential Total Cells


Counted 100 


  


  


 


 


Neutrophils % (Manual) 49 % (45-75)   


 


Lymphocytes % (Manual) 38 % (20-45)   


 


Monocytes % (Manual) 10 % (1-10)   


 


Eosinophils % (Manual) 2 % (0-3)   


 


Basophils % (Manual) 1 % (0-2)   


 


Band Neutrophils 0 % (0-8)   


 


Platelet Estimate Decreased   


 


Platelet Morphology Normal   


 


Hypochromasia 1+   


 


Anisocytosis 1+   


 


Sodium Level


  129 MMOL/L


(136-145) 


  


 


 


Potassium Level


  7.8 MMOL/L


(3.5-5.1) 


  


 


 


Chloride Level


  96 MMOL/L


() 


  


 


 


Carbon Dioxide Level


  21 MMOL/L


(21-32) 


  


 


 


Anion Gap


  11 mmol/L


(5-15) 


  


 


 


Blood Urea Nitrogen


  82 mg/dL


(7-18) 


  


 


 


Creatinine


  9.6 MG/DL


(0.55-1.30) 


  


 


 


Estimat Glomerular Filtration


Rate 6.9 mL/min


(>60) 


  


 


 


Glucose Level


  86 MG/DL


() 


  


 


 


Lactic Acid Level


  1.50 mmol/L


(0.4-2.0) 


  


 


 


Calcium Level


  10.9 MG/DL


(8.5-10.1) 


  


 


 


Total Bilirubin


  0.6 MG/DL


(0.2-1.0) 


  


 


 


Aspartate Amino Transf


(AST/SGOT) 37 U/L (15-37) 


  


  


 


 


Alanine Aminotransferase


(ALT/SGPT) 151 U/L


(12-78) 


  


 


 


Alkaline Phosphatase


  57 U/L


() 


  


 


 


Total Creatine Kinase


  335 U/L


() 


  


 


 


Troponin I


  0.000 ng/mL


(0.000-0.056) 


  


 


 


Total Protein


  6.8 G/DL


(6.4-8.2) 


  


 


 


Albumin


  4.1 G/DL


(3.4-5.0) 


  


 


 


Globulin 2.7 g/dL   


 


Albumin/Globulin Ratio 1.5 (1.0-2.7)   


 


Urine Color  Yellow  


 


Urine Appearance  Clear  


 


Urine pH  5 (4.5-8.0)  


 


Urine Specific Gravity


  


  1.015


(1.005-1.035) 


 


 


Urine Protein  2+ (NEGATIVE)  


 


Urine Glucose (UA)


  


  Negative


(NEGATIVE) 


 


 


Urine Ketones


  


  Negative


(NEGATIVE) 


 


 


Urine Occult Blood  2+ (NEGATIVE)  


 


Urine Nitrite


  


  Negative


(NEGATIVE) 


 


 


Urine Bilirubin  1+ (NEGATIVE)  


 


Urine Ictotest


  


  Negative


(NEGATIVE) 


 


 


Urine Urobilinogen


  


  Normal MG/DL


(0.0-1.0) 


 


 


Urine Leukocyte Esterase  1+ (NEGATIVE)  


 


Urine RBC


  


  5-10 /HPF (0 -


0) 


 


 


Urine WBC


  


  2-4 /HPF (0 -


0) 


 


 


Urine Squamous Epithelial


Cells 


  None /LPF


(NONE/OCC) 


 


 


Urine Bacteria


  


  Few /HPF


(NONE) 


 


 


Arterial Blood pH


  


  


  7.333


(7.350-7.450)


 


Arterial Blood Partial


Pressure CO2 


  


  29.3 mmHg


(35.0-45.0)


 


Arterial Blood Partial


Pressure O2 


  


  40.5 mmHg


(75.0-100.0)


 


Arterial Blood HCO3


  


  


  15.2 mmol/L


(22.0-26.0)


 


Arterial Blood Oxygen


Saturation 


  


  71.9 %


(92.0-98.0)


 


Arterial Blood Base Excess   -9.7 


 


Ken Test   Positive 








EKG Diagnostic Results


Rate:  bradycardiac


Rhythm:  other - sinus bradycardia


ST Segments:  other - right bundle branch block, wide QRS


Other Impression


prolonged AZ interval wide QRS No ST elevation





Chest X-Ray Diagnostic Results


Chest X-Ray Diagnostic Results :  


   Chest X-Ray Ordered:  Yes


   # of Views/Limited/Complete:  1 View


   Indication:  Other - critically ill


   Interpretation:  no consolidation, no effusion, no pneumothorax


   Impression:  No acute disease


   Electronically Signed by:  This image has been electronically signed by Dr. Kin Houston


Reevaluation Time:  20:00





Last Vital Signs








  Date Time  Temp Pulse Resp B/P (MAP) Pulse Ox O2 Delivery O2 Flow Rate FiO2


 


8/13/18 22:59      Venturi Mask 15.0 50


 


8/13/18 22:59  66 20     


 


8/13/18 22:45    100/44 (62) 100   


 


8/13/18 22:30 94.7       





 94.7       








Status:  unchanged


Reevaluation Impression


persistent hypotension throughout majority of ER encounter


Disposition:  ADMITTED AS INPATIENT


Condition:  Critical


Referrals:  


ELVIRA NOGUEIRA (PCP)











KIN [place] Aug 14, 2018 00:05

## 2018-08-15 VITALS — SYSTOLIC BLOOD PRESSURE: 90 MMHG | DIASTOLIC BLOOD PRESSURE: 60 MMHG

## 2018-08-15 VITALS — SYSTOLIC BLOOD PRESSURE: 77 MMHG | DIASTOLIC BLOOD PRESSURE: 43 MMHG

## 2018-08-15 VITALS — SYSTOLIC BLOOD PRESSURE: 85 MMHG | DIASTOLIC BLOOD PRESSURE: 35 MMHG

## 2018-08-15 VITALS — SYSTOLIC BLOOD PRESSURE: 102 MMHG | DIASTOLIC BLOOD PRESSURE: 87 MMHG

## 2018-08-15 VITALS — SYSTOLIC BLOOD PRESSURE: 66 MMHG | DIASTOLIC BLOOD PRESSURE: 28 MMHG

## 2018-08-15 VITALS — SYSTOLIC BLOOD PRESSURE: 96 MMHG | DIASTOLIC BLOOD PRESSURE: 69 MMHG

## 2018-08-15 VITALS — DIASTOLIC BLOOD PRESSURE: 63 MMHG | SYSTOLIC BLOOD PRESSURE: 85 MMHG

## 2018-08-15 VITALS — SYSTOLIC BLOOD PRESSURE: 85 MMHG | DIASTOLIC BLOOD PRESSURE: 63 MMHG

## 2018-08-15 VITALS — SYSTOLIC BLOOD PRESSURE: 79 MMHG | DIASTOLIC BLOOD PRESSURE: 35 MMHG

## 2018-08-15 VITALS — DIASTOLIC BLOOD PRESSURE: 29 MMHG | SYSTOLIC BLOOD PRESSURE: 64 MMHG

## 2018-08-15 LAB
ADD MANUAL DIFF: YES
ALBUMIN SERPL-MCNC: 2.9 G/DL (ref 3.4–5)
ALBUMIN/GLOB SERPL: 1.5 {RATIO} (ref 1–2.7)
ALP SERPL-CCNC: 45 U/L (ref 46–116)
ALT SERPL-CCNC: 148 U/L (ref 12–78)
ANION GAP SERPL CALC-SCNC: 15 MMOL/L (ref 5–15)
AST SERPL-CCNC: 61 U/L (ref 15–37)
BILIRUB DIRECT SERPL-MCNC: 1.2 MG/DL (ref 0–0.3)
BILIRUB SERPL-MCNC: 1.9 MG/DL (ref 0.2–1)
BUN SERPL-MCNC: 76 MG/DL (ref 7–18)
CALCIUM SERPL-MCNC: 8.6 MG/DL (ref 8.5–10.1)
CHLORIDE SERPL-SCNC: 104 MMOL/L (ref 98–107)
CK SERPL-CCNC: 399 U/L (ref 26–308)
CO2 SERPL-SCNC: 15 MMOL/L (ref 21–32)
CREAT SERPL-MCNC: 9 MG/DL (ref 0.55–1.3)
ERYTHROCYTE [DISTWIDTH] IN BLOOD BY AUTOMATED COUNT: 15.1 % (ref 11.6–14.8)
GAMMA GLUTAMYL TRANSPEPTIDASE: 301 U/L (ref 5–85)
GLOBULIN SER-MCNC: 2 G/DL
HCT VFR BLD CALC: 26.1 % (ref 42–52)
HGB BLD-MCNC: 8.6 G/DL (ref 14.2–18)
MCV RBC AUTO: 93 FL (ref 80–99)
PHOSPHATE SERPL-MCNC: 6.8 MG/DL (ref 2.5–4.9)
PLATELET # BLD: 29 K/UL (ref 150–450)
POTASSIUM SERPL-SCNC: 8.1 MMOL/L (ref 3.5–5.1)
RBC # BLD AUTO: 2.82 M/UL (ref 4.7–6.1)
SODIUM SERPL-SCNC: 135 MMOL/L (ref 136–145)
WBC # BLD AUTO: 5.1 K/UL (ref 4.8–10.8)

## 2018-08-15 RX ADMIN — HYDROCORTISONE SODIUM SUCCINATE SCH MG: 100 INJECTION, POWDER, FOR SOLUTION INTRAMUSCULAR; INTRAVENOUS at 06:29

## 2018-08-15 RX ADMIN — HUMAN INSULIN SCH MLS/HR: 100 INJECTION, SOLUTION SUBCUTANEOUS at 02:15

## 2018-08-15 RX ADMIN — IPRATROPIUM BROMIDE AND ALBUTEROL SULFATE SCH ML: .5; 3 SOLUTION RESPIRATORY (INHALATION) at 01:25

## 2018-08-15 NOTE — DIAGNOSTIC IMAGING REPORT
Indication: Status post nasogastric tube placement

 

Technique: One view of the chest

 

Comparison: 2 hours earlier

 

Findings: Interim placement of an enteric tube, distal portion of which is poorly

visualized due to patient body habitus. Stable satisfactory positions of endotracheal

tube, right jugular catheters. Pulmonary aeration equivocally slightly improved

bilaterally. Left basilar consolidation and pleural fluid appear stable. Bilateral

supraclavicular and neck subcutaneous emphysema is unchanged. No gross pneumothorax

is demonstrated

 

Impression: Interim enteric tube placement, tip position indeterminate, although

appearing satisfactory on subsequent abdomen radiograph

 

Equivocally slightly improved pulmonary parenchymal aeration, over 2 hours

 

Other stable findings as described

 

This agrees with the preliminary interpretation provided overnight by [facility]

teleradiology service.

## 2018-08-15 NOTE — CONSULTATION
DATE OF CONSULTATION:  08/14/2018



CARDIOLOGY CONSULTATION



CONSULTING PHYSICIAN:  Drew Gomez M.D.



REFERRING PHYSICIAN:  David King M.D.



REASON FOR CONSULTATION:  Management of elevated troponin I level.



HISTORY OF PRESENT ILLNESS:  The patient is a very unfortunate 57-year-old

gentleman with morbid obesity, who presents to the hospital with

generalized weakness for about 3 weeks, diffuse diarrhea following use of

laxatives in order to diet as well as poor dieting in the past couple of

weeks, eating only vegetables and drinking water.



He is bedbound due to bilateral hip pain.



At the time of arrival to the hospital, his blood pressure was

hypotensive with blood pressure of 77/55 mmHg and pulse of 64.  A 12-lead

electrocardiogram showed sinus bradycardia with low voltage and

nonspecific intraventricular conduction delay.



He was admitted to intensive care unit for management of hypotension as

well as acute kidney injury.  Initial potassium level was 7.8.  Creatinine

level was measured at 9.6.  Apparently, the patient has a history of

chronic kidney disease and his creatinine is ranging from 2 to 3.



PAST MEDICAL HISTORY:

1. History of morbid obesity.

2. History of CKD.

3. History of diabetes mellitus type 2.

4. History of obesity hypoventilation syndrome.

5. History of bilateral hip pain.

6. History of diarrhea due to laxative use.



PAST SURGICAL HISTORY:  Right hip surgery, presence of prosthesis.



MEDICATIONS:  Amlodipine 10 mg p.o. daily, aspirin 325 mg p.o. daily,

benazepril 20 mg p.o. daily, _____ 5 mg p.o. daily, metformin 750 mg

daily, isosorbide 10 mg p.o. daily, and albuterol inhaler 1 to 2 puffs

q.4-6 hours p.r.n. shortness of breath.



ALLERGIES:  No known drug allergies.



FAMILY HISTORY:  No premature coronary artery disease or arrhythmogenic

death in first-degree relatives.



REVIEW OF SYSTEMS:  The patient is currently being intubated.  Therefore,

review of systems could not be obtained.



From ER physician note, the patient had complaints of malaise, diarrhea,

back pain, and muscle pain.



PHYSICAL EXAMINATION:

GENERAL:  Morbidly obese 57-year-old gentleman, in mild respiratory

distress.

VITAL SIGNS:  Blood pressure was 77/55, respirations 16, pulse of 64, O2

saturation 99% on room air, and temperature 98.4 degrees Fahrenheit.

HEENT:  Atraumatic and normocephalic.  Anicteric.  Pupils are equal, round,

and reactive to light and accommodation.  Extraocular muscles intact.

NECK:  Could not assess JVP due to obesity and short neck and no carotid

bruit.

CARDIOVASCULAR:  Normal S1 and S2.  Regular rate and rhythm.  No murmurs,

gallops, or rubs.

LUNGS:  Clear to auscultation bilaterally.

ABDOMEN:  Distended due to obesity.  No hepatosplenomegaly.  Positive bowel

sounds.

EXTREMITIES:  No evidence of edema, clubbing, or cyanosis.



LABORATORY AND DIAGNOSTIC DATA:  Sodium was 139, potassium is 7.8, chloride

96, bicarbonate 21, BUN of 82, creatinine 9.6, and glucose is 86.  Calcium

is 10.9.  AST was 151.  Troponin I was 0 and second troponin I was 0.17,

third troponin was 0.08.  WBC was 6.8, hemoglobin 8.5, hematocrit of 24.3,

and platelet count is 60.  Blood gas showed pH 7.33, pCO2 of 29.3, pO2 was

40.5, bicarbonate 16.2, and O2 saturation 71.9.



Chest x-ray revealed probable left pleural effusion.  Hazy parenchymal

opacity.  A 2D echocardiography revealed normal LV systolic function with

LVEF of about 60%-65%.  No wall motion abnormalities.  There is moderate

RV enlargement with severely decreased RV systolic function.  Trace

tricuspid regurgitation with RVSP at 23 mmHg.  This study was technically

difficult due to the body habitus.



ASSESSMENT AND PLAN:  The patient is a very unfortunate 57-year-old

gentleman, seen in the intensive care unit of [facility].



1. Slight elevation of troponin I level most likely due to hypotension,

a 12-lead electrocardiogram does not show any distinct ST-segment changes

and nonspecific ST and T-wave abnormality most likely than not account for

acute coronary syndrome.  The pattern of troponin rise is also not

compatible with acute coronary syndrome or acute _____.

2. The patient is not currently a suitable candidate for antiplatelet

therapy given the hemoglobin of 7.  He may in fact require a blood

transfusion.

3. Severe hyperkalemia with no EKG changes.  We would like to repeat

12-lead electrocardiogram stat.  The patient requires to be on a

combination of Lasix, insulin, D5 as well as Kayexalate in order to avoid

cardiac arrhythmias.  Due to widening QRS, the patient will require to

have calcium gluconate as well.

4. Moderate RV dilatation with severely decreased RV systolic function,

likely secondary to ROSAS, cannot rule out chronic DVT given the patient's

body habitus.  Of note, we cannot rely on RVSP in this echocardiography

due to technically difficult study.

5. A V/Q scan in the future might be beneficial.

6. Obesity with acute hypercapnic hypoxic respiratory failure, currently

being intubated.



Total amount of time spent in the evaluation of this patient in the

intensive care unit of [facility] discussing the plan of care

with the nursing staff and primary care physician was 50 minutes.



I would like to thank Dr. King for the courtesy of this

consultation.









  ______________________________________________

  Drew Gomez M.D.





DR:  AVERY

D:  08/14/2018 20:48

T:  08/15/2018 03:17

JOB#:  [id number]

CC:

## 2018-08-15 NOTE — CARDIOLOGY REPORT
--------------- APPROVED REPORT --------------





EKG Measurement

Heart [id number]

NV 

[id number]

[id number]

[id number]

[id number]





Normal sinus rhythm

Left axis deviation

Right bundle branch block

Possible Lateral infarct, age undetermined

Abnormal ECG

## 2018-08-15 NOTE — DIAGNOSTIC IMAGING REPORT
Indication: Abdominal pain

 

Technique: Gray-scale and duplex images of the upper abdomen were obtained

 

Comparison: none

 

Findings: There is trace free intraperitoneal fluid adjacent to the left hepatic

lobe.   Gallbladder is filled with stones. There is mild gallbladder wall thickening,

gallbladder wall measuring 4 mm thick, although gallbladder is incompletely

distended.  Sonographic Conroy's sign is negative.  Common bile duct measures 2 mm in

diameter.  No intrahepatic biliary ductal dilatation.  Liver is slightly enlarged,

demonstrates equivocal slight increased echogenicity, compatible with hepatocellular

disease. No surface nodularity demonstrated   Portal vein and hepatic veins are

patent.   Pancreas is incompletely visualized due to overlying bowel gas, visualized

portions are unremarkable.   The spleen is borderline enlarged, measuring 13 cm long

axis dimension  Left kidney measures 13.5 cm in length.  Right kidney measures 13.4

cm length.  Both kidneys demonstrate normal echogenicity.  There is mild fullness of

the right renal collecting system without bryn hydronephrosis, also demonstrated on

recent renal ultrasound.   There is a 2.3 cm right renal cyst . Abdominal aorta is

partially obscured by bowel gas, visualized portions are non-aneurysmal .

 

Impression: Cholelithiasis. Borderline gallbladder wall thickening, probably an

artifact of incomplete distention but does raise the possibility of acute

cholecystitis. Consider nuclear medicine hepatobiliary scan for further evaluation if

there is high clinical suspicion for such

 

Negative for dilated ducts

 

Mildly enlarged liver with slightly increased echogenicity, compatible with

hepatocellular disease, most likely fatty change

 

Trace ascites

 

Borderline splenomegaly

 

Mild fullness of the right renal collecting system without bryn hydronephrosis

 

Other findings as noted, including right renal cyst

 

Note suboptimal visualization of the pancreas and abdominal aorta

## 2018-08-15 NOTE — DIAGNOSTIC IMAGING REPORT
Indication: Abdominal distention

 

Technique: Supine view of the upper abdomen

 

Comparison: none

 

Findings: There is an enteric tube in place, tip projected at the level of the

gastric fundus, proximal port beyond the gastroesophageal junction

 

Impression: Limited exam demonstrating satisfactory position of enteric tube

 

This agrees with the preliminary interpretation provided overnight by [facility]

teleradiology service.

## 2018-08-15 NOTE — CONSULTATION
DATE OF CONSULTATION:  08/14/2018



NOTE:  INCOMPLETE DICTATION



CARDIOLOGY CONSULTATION



CONSULTING PHYSICIAN:  Drew Gomez M.D.



REFERRING PHYSICIAN:  David King M.D.



REASON FOR CONSULTATION:  Management of elevated troponin I level.



HISTORY OF PRESENT ILLNESS:  This is a very unfortunate 57-year-old

morbidly obese gentleman with a history of obesity, hypoventilation

syndrome, chronic kidney disease, and diabetes mellitus type 2, who

presented to the hospital with generalized weakness for about 3 weeks.

According to the ER note, the patient was unable to stand up on his own.

Apparently, he has been using laxatives, has been eating vegetables, and

drinking water.  He has had diffuse diarrhea.  On arrival to the emergency

department, initial blood pressure was 77/55 mmHg and heart rate was 64.

Initial 12-lead electrocardiogram revealed sinus bradycardia at the rate

of 50 with low voltage and wide QRS complex consistent with nonspecific

intraventricular conduction delay.  There was nonspecific ST and T-wave

abnormalities.  According to the ER physician, ______ had spoken to his

primary care physician to get out of the baseline creatinine ranging from

2 to 3.  The patient was diagnosed with acute kidney injury and severe

hyperlipidemia with potassium at 7.8.  He was given Kayexalate.



Repeat of potassium was 7.1.



The patient was admitted to intensive care unit for further evaluation

and management of hypertension as well as acute kidney injury and

hyperkalemia.



A 2D echocardiography revealed normal LV systolic function with LVEF

approximately 75%.  Right ventricular dilatation is seen.  There is also

severe decrease in RV systolic function.



PAST MEDICAL HISTORY:  As mentioned above including,



1. Morbid obesity.

2. Obesity hypoventilation syndrome.

3. Chronic kidney disease.

4. Diabetes mellitus.



LIST OF MEDICATIONS:

1. Amlodipine 10 mg p.o. daily.

2. Aspirin 325 mg p.o. daily.

3. Benazepril 20 mg p.o. daily.

4. Glyburide 5 mg p.o. daily.

5. Metformin 750 mg daily.

6. Isosorbide mononitrate _____ mg p.o. daily.

7. Albuterol inhaler twice daily.



ALLERGIES:  No known drug allergies.



SOCIAL HISTORY:  Denies any tobacco, alcohol, or illicit drug use.  He

lives at home.  Bed-bound due to bilateral hip pain.



REVIEW OF SYSTEMS:  Currently, the patient is getting intubated.

Therefore, review of systems could not be obtained.



From the ER physician note, the patient had trouble with malaise,

diarrhea, back pain, muscle pain, but no chest pain or shortness of

breath.









  ______________________________________________

  Drew Gomez M.D.





DR:  DERECK

D:  08/14/2018 20:32

T:  08/14/2018 23:41

JOB#:  [id number]

CC:

## 2018-08-15 NOTE — DIAGNOSTIC IMAGING REPORT
--------------- APPROVED REPORT --------------





CPT Code: 00929



Present Symptoms

Shortness of breath 





BILATERAL: Imaging reveals a patent deep venous system bilaterally. There is no evidence 

of thrombus within the femoral, popliteal or tibial segments. The greater saphenous veins 

are also within normal limits. Doppler indicates normal spontaneous flow within these 

segments.

Technically difficult study due to patient body habitus.

## 2018-08-15 NOTE — HISTORY AND PHYSICAL REPORT
DATE OF ADMISSION:  08/13/2018



SOURCE OF INFORMATION:  Patient and EMR.



HISTORY OF PRESENT ILLNESS:  The patient is a 57-year-old male with the

history of blood pressure and diabetes, who presented with change in

mental status to the hospital.  Initial vital signs in the emergency room

showed that the patient had a markedly low blood pressure with the low

pulse rate.  The patient has been awake, but demonstrated lack of

cognition and alertness.    At the time of

evaluation in the intensive care unit bed, the patient denies any chest

pain or shortness of breath.  Denies any nausea or vomiting.  .  Denies

any abnormal bleeding.  Denies any severe pain or swelling.



REVIEW OF SYSTEMS:  All 12 elements of review of systems reviewed with the

patient.   Pertinent positive and negative as above.



CURRENT HOSPITAL MEDICATIONS:  Including but not limited to allopurinol,

daptomycin, and Zosyn.



ALLERGIES:  NKDA.



SOCIAL HISTORY:  The patient reportedly lives by himself.  The

patient denies history of illicit drug abuse, smoking, or alcohol abuse.



PHYSICAL EXAMINATION:

VITAL SIGNS:  Blood pressure 70/50, pulse rate 56, temperature 98.2, and

pulse oximetry 100% on room air.

HEENT:  Head is atraumatic and normocephalic.

CHEST:  Clear to auscultation.  No wheezing.  No crackles.

HEART:  S1 and S2.  Regular rate and rhythm.

ABDOMEN:  Soft.  No organomegaly.

MUSCULOSKELETAL:  No gross lateralized motor deficit.

NEUROLOGY:  The patient is awake.  He has decrease and lack in the memory

and lack in the cognition and slow mentation.

MUSCULOSKELETAL:  No gross focal motor deficit.



LABORATORY DATA:  Labs dated August 13, 2018 showed WBC 6.8, hemoglobin 8.5, and

platelet count of 60.  Sodium 129, potassium 7.8, BUN 82, and creatinine

9.6.  The ALT is 161.  Urinalysis is negative for acute infection.



ASSESSMENT:

1. Acute encephalopathy

2. Septic shock, source work in progress

2. Diabetes type 2, uncontrolled.

3. Noncompliance with medication and treatment advise as an outpatient.

4. Acute on chronic anemia.

5. Abnormal liver function tests.

6. Gastrointestinal and deep vein thrombosis prophylaxes.

7. Hyperkalemia.



PLAN OF CARE:  I discussed the care in detail with Dr. Vázquez, the ER

attending.  We will consult the surgeon regarding the emergency insertion

of the hemodialysis access, Nephrology doctor, Dr. Herrera notified. Critical 
care specialist, Dr Aceves notified. Patient in serious medical condition. I 
agree with current empirical antibiotic regiments. 









  ______________________________________________

  David King M.D.





DR:  FLAKITA

D:  08/14/2018 16:09

T:  08/15/2018 00:34

JOB#:  [id number]

CC:



GEOFFREY

## 2018-08-15 NOTE — DIAGNOSTIC IMAGING REPORT
Indication: Post intubation

 

Technique: One view of the chest

 

Comparison: 8/13/2018

 

Findings: Interim endotracheal intubation, endotracheal tube tip projecting

approximately or centimeters above the ayaan. There is subcutaneous emphysema in the

neck and bilateral supraclavicular fossae. No evidence of pneumothorax or

pneumomediastinum. Again demonstrated is hazy right lung parenchymal disease. Left

lower lobe opacification and left basilar pleural fluid appears to be increased.

Central venous and temporary dialysis catheter is again demonstrated.

 

Impression: Satisfactory endotracheal intubation

 

Bilateral supraclavicular fossa and neck subcutaneous emphysema

 

Increased pleural and parenchymal disease of the left lung base

 

Other stable findings as described

 

This agrees with the preliminary interpretation provided overnight by [facility]

teleradiology service.

## 2018-08-15 NOTE — EMERGENCY ROOM REPORT
History of Present Illness


General


Chief Complaint:  Abnormal Labs


Source:  Patient, Medical Record





Present Illness


HPI


I was called to the ICU to respond to a code blue, asystole, CPR in progress, 2 

rounds of epi given, upon my pulse check, patient with good carotid pulse.  

Patient's accuchek was 250, I requested 1 amp CaCl, and 10 units insulin.  

Patient with narrow complex rhythm on monitor.  Already on levophed, 

bicarbonate drips, and had multiple doses of kayexalate and hemodialysis.  I 

requested adding epinephrine drip.   ROSC achieved, ETT still in place.


Allergies:  


Coded Allergies:  


     No Known Allergies (Unverified , 8/13/18)





Nursing Documentation-[facility]


Past Medical History:  No History, Except For


Hx Cardiac Problems:  Yes


Hx Hypertension:  Yes


Hx Diabetes:  Yes - TYPE 2


Hx Cancer:  No


Hx Gastrointestinal Problems:  No


Hx Neurological Problems:  No





Physical Exam





Vital Signs








  Date Time  Temp Pulse Resp B/P (MAP) Pulse Ox O2 Delivery O2 Flow Rate FiO2


 


8/13/18 17:37 98.4 64 16 77/55 99 Room Air  





 98.4       


 


8/13/18 17:45       2.0 


 


8/13/18 19:20        28











Medical Decision Making


Diagnostic Impression:  


 Primary Impression:  


 AIRAM (acute kidney injury)


 Additional Impressions:  


 Morbid obesity


 Anemia


 Diabetes


 Hyponatremia


 Obesity hypoventilation syndrome





Last Vital Signs








  Date Time  Temp Pulse Resp B/P (MAP) Pulse Ox O2 Delivery O2 Flow Rate FiO2


 


8/15/18 05:26  89      


 


8/15/18 05:16   32     100


 


8/15/18 04:00 97.6   90/60 (70) 89   





 97.6       


 


8/15/18 04:00      Mechanical Ventilator  


 


8/15/18 01:25       55.0 








Disposition:  ADMITTED AS INPATIENT


Condition:  Critical


Referrals:  


ELVIRA NOGUEIRA (PCP)











ANITRA STONER M.D Aug 15, 2018 06:27

## 2018-08-16 NOTE — DISCHARGE SUMMARY
Discharge Summary


Discharge Summary


_


DATE OF ADMISSION: 2018


DATE OF DISCHARGE: 08/15/2018





BRIEF DEATH SUMMARY:


Patient is an unfortunate 57-year-old male, with history of hypertension, 

diabetes, CKD, obesity and obstructive sleep apnea, presented to ED due to 

change in mental status.  He was complaining of severe generalized weakness for 

3 weeks.  He was unable to get up and stand on his own.  He had been recently 

dieting and was only eating vegetables and drinking water.  He was using 

laxatives which has caused diffuse diarrhea.  He had bilateral hip pain for 

several weeks.  He denied fever.  Denied abdominal pain.  Denied chest pain.





On evaluation at ED, he was severely hypotensive, blood pressure was 77/55.  He 

was given fluid resuscitation.  Blood work showed hemoglobin of 8.5 and 

hematocrit 24, platelet count was 60.  He was noted to have elevated potassium 

level of 7.8, sodium was 129 and chloride was 96.  BUN was elevated to 82/

creatinine 9.6.  Baseline creatinine was 2-3.  EKG was done and showed 

bradycardia with prolonged IL interval and wide QRS.  Chest x-ray showed no 

acute disease.  He continued to be hypotensive despite IV fluids.  He was given 

insulin and dextrose, he was given calcium gluconate and sodium bicarbonate.  A 

hemodialysis catheter and central line was placed to the right IJ.  He was 

admitted to ICU in critical condition.  





He was noted to have low platelet count and was anemic.  He was started on IV 

iron and folic acid.  He had low platelet counts.  Hepatitis and HIV panel 

negative.  Potassium continued to rise.  Renal ultrasound showed normal 

echogenicity.  He was given Kayexalate and was eventually started on 

hemodialysis.  He was started on Zosyn and daptomycin pending culture results.





He continued to have profound acidosis without compensation.  He was initially 

tried on BiPAP and PEEP was increased.  Despite dialysis, he remained acidotic.

  Patient required intubation.





He was noted to have elevated troponin.  EKG showed sinus bradycardia with 

nonspecific ST and T wave abnormalities.  He is not a candidate for 

antiplatelet therapy.  Echocardiogram revealed normal LV systolic function EF 60

-65% with severely decreased RV systolic function likely secondary to ROSAS.  

Venous duplex was negative.





He continued to be hypotensive despite being on pressors.  A CODE BLUE was 

called.  Resuscitative efforts failed and patient eventually .





FINAL DIAGNOSES: 


Cardiopulmonary arrest


Septic shock


Acute hypercapnic hypoxic respiratory failure


Acute on chronic renal failure requiring hemodialysis


Morbid obesity


Hyponatremia


Diabetes type 2, uncontrolled with low blood sugar


Noncompliance with medication and treatment advice as outpatient


Acute on chronic anemia


Abnormal liver function tests


Severe hyperkalemia


Thrombocytopenia


Obstructive sleep apnea


Bacteremia, blood culture with CONS





DISPOSITION: Patient .





I have been assigned to dictate discharge summary on this account, and I was 

not involved in the patient's management.











Izzy Carmona NP Aug 16, 2018 11:10
